# Patient Record
Sex: FEMALE
[De-identification: names, ages, dates, MRNs, and addresses within clinical notes are randomized per-mention and may not be internally consistent; named-entity substitution may affect disease eponyms.]

---

## 2022-09-24 ENCOUNTER — NURSE TRIAGE (OUTPATIENT)
Dept: OTHER | Facility: CLINIC | Age: 82
End: 2022-09-24

## 2022-09-24 NOTE — TELEPHONE ENCOUNTER
Subjective: Caller states \" I had the Moderna Variant shot on Thursday afternoon and I feel tired and have a headache \"     Current Symptoms:   +Friday morning - \"woke up with a headache and really tired\"   +feel sore allover   -denies redness or soreness at injection site  +headaches has subsided today   +able to walk normally   -denies shortness of breath or chest pain         Onset: 2 days ago; sudden    Associated Symptoms: NA    Pain Severity: 5/10; Soreness ; intermittent    Temperature: 99.1 orally    What has been tried: Advil     LMP: NA Pregnant: NA    Recommended disposition: Raheel Margie 9434 advice provided, patient verbalizes understanding; denies any other questions or concerns; instructed to call back for any new or worsening symptoms. Home Care with Care Advice     This triage is a result of a call to [address] [place] [address]. Please do not respond to the triage nurse through this encounter. Any subsequent communication should be directly with the patient.       Reason for Disposition   COVID-19 vaccine, systemic reactions (e.g., fatigue, fever, muscle aches), questions about    Protocols used: Coronavirus (COVID-19) Vaccine Questions and Reactions-ADULT-

## 2022-09-26 ENCOUNTER — NURSE TRIAGE (OUTPATIENT)
Dept: OTHER | Facility: CLINIC | Age: 82
End: 2022-09-26

## 2022-09-26 NOTE — TELEPHONE ENCOUNTER
Subjective: Caller states \"Covid\"     Current Symptoms:   Spoke to a nurse the other day about body and head aches, fatigue  Had just gotten a covid vaccine on Thursday, symptoms started the next day  Body aches have worsened, now having rib pain  Did a home Covid test and it is positive  Has a runny nose  Denies SOB, cough    Onset:   Friday; worsening    Associated Symptoms: NA    Pain Severity: 5/10; aching, worse with walking    Temperature: chills, 98.4 this morning     What has been tried: advil, coricidin     LMP: NA Pregnant: NA    Recommended disposition: Go to ED/UCC Now (Or to Office with PCP Approval)    Care advice provided, patient verbalizes understanding; denies any other questions or concerns; instructed to call back for any new or worsening symptoms. Patient/caller warm transferred to [facility] at [facility] for appointment scheduling    This triage is a result of a call to [address] [place] [address] of [employer]. Please do not respond to the triage nurse through this encounter. Any subsequent communication should be directly with the patient.     Reason for Disposition   Chest pain or pressure  (Exception: MILD central chest pain, present only when coughing)    Protocols used: Coronavirus (COVID-19) Diagnosed or Suspected-ADULT-[place]

## 2023-02-27 LAB — SARS-COV-2 RESULT: NOT DETECTED

## 2023-03-01 LAB
ACTIVATED PARTIAL THROMBOPLASTIN TIME IN PPP BY COAGULATION ASSAY: 33 SEC (ref 26–39)
INR IN PPP BY COAGULATION ASSAY: 1.1 (ref 0.9–1.1)
PROTHROMBIN TIME (PT) IN PPP BY COAGULATION ASSAY: 12.5 SEC (ref 9.8–13.4)

## 2023-06-13 LAB — THYROTROPIN (MIU/L) IN SER/PLAS BY DETECTION LIMIT <= 0.05 MIU/L: 3.81 MIU/L (ref 0.44–3.98)

## 2023-10-03 DIAGNOSIS — E03.9 HYPOTHYROIDISM (ACQUIRED): ICD-10-CM

## 2023-10-03 NOTE — TELEPHONE ENCOUNTER
Chart reviewed.  TSH in June was WNL.  Prescription renewed at current dose for 90 days with 2 additional refills.  Next TSH will be due June 2024

## 2023-10-04 DIAGNOSIS — R13.12 OROPHARYNGEAL DYSPHAGIA: ICD-10-CM

## 2023-10-04 RX ORDER — LEVOTHYROXINE SODIUM 25 UG/1
25 TABLET ORAL
Qty: 90 TABLET | Refills: 2 | Status: SHIPPED | OUTPATIENT
Start: 2023-10-04 | End: 2023-12-05 | Stop reason: DRUGHIGH

## 2023-10-04 NOTE — PROGRESS NOTES
From: Marco Monteil <[email or web address]>   Sent: Tuesday, October 3, 2023 4:17 PM  To: Bee Downey <[email or web address]>  Subject: Jennifer     Hi Bee. Jennifer and I just spoke with Dr. Adair about her prosthesis, speech, and swallowing. He's going to do a few more adjustments to improve lingual/palatal contact. She's had her prosthesis about 3-4 weeks and is continuing to work   Hi Bee.     Jennifer and I just spoke with Dr. Adair about her prosthesis, speech, and swallowing.  He's going to do a few more adjustments to improve lingual/palatal contact.  She's had her prosthesis about 3-4 weeks and is continuing to work hard in therapy.  We'd like to repeat the modified barium swallow.  We completed the last one on August 8.  Would you be ok with giving me that order?       Thanks!  We are aiming to complete the MBS Oct 12 or 13.     Marco Montiel M.A., CCC-SLP  Speech-Language Pathologist      They care not on EPIC.  Order entered and emailed to Marco.

## 2023-10-05 ENCOUNTER — TELEPHONE (OUTPATIENT)
Dept: OTOLARYNGOLOGY | Facility: HOSPITAL | Age: 83
End: 2023-10-05
Payer: MEDICARE

## 2023-10-05 DIAGNOSIS — R13.12 OROPHARYNGEAL DYSPHAGIA: ICD-10-CM

## 2023-10-05 NOTE — TELEPHONE ENCOUNTER
"Jennifer sent me an email today at 10:58 a.m. stating:    \"Johan Gamboa    Got a notice from [facility] that they could not fill my L-thyroxine (synthroid) prescription since it hasn't been renewed by Dr. Camacho.  Am I not required to take the medication anymore or was it just an oversight?  If ti was an oversight, could you please contact [facility] with a renewal since I will be completely out of the tablets in 10 days.  If I'm not to take it any longer, please advise me of that.    Thank you.    Jennifer\"    I reviewed the chart and emailed her back at 11:10 a.m.:    \"Jennifer--    Your prescription was renewed on 10/4/23 and the pharmacy confirmed electronic receiptof the refill at 8:56 a.m.  Please contact [facility] to follow up on this.    This is a medication that you will be on FOR LIFE.  The dose may change over time, which is why annual blood work is also needed in order to renew the prescription.    Bee Downey, MSN, RN, ACNS-BC, CORLN  Clinical Nurse Specialist  Certified Otorhinolaryngology Nurse  [phone]/fax [phone]\"  "

## 2023-10-19 PROBLEM — C01 CANCER OF BASE OF TONGUE (MULTI): Status: ACTIVE | Noted: 2023-10-19

## 2023-10-19 PROBLEM — D48.5 NEOPLASM OF UNCERTAIN BEHAVIOR OF SKIN: Status: ACTIVE | Noted: 2023-07-06

## 2023-10-19 PROBLEM — L92.9: Status: ACTIVE | Noted: 2023-10-19

## 2023-10-19 PROBLEM — L57.0 ACTINIC KERATOSIS: Status: ACTIVE | Noted: 2023-07-06

## 2023-10-19 PROBLEM — T66.XXXA ADVERSE EFFECT OF RADIATION: Status: ACTIVE | Noted: 2023-10-19

## 2023-10-19 PROBLEM — R13.10 DYSPHAGIA: Status: ACTIVE | Noted: 2023-10-19

## 2023-10-19 PROBLEM — C06.9: Status: ACTIVE | Noted: 2023-10-19

## 2023-10-19 PROBLEM — E03.9 ACQUIRED HYPOTHYROIDISM: Status: ACTIVE | Noted: 2023-10-19

## 2023-10-19 PROBLEM — L82.0 INFLAMED SEBORRHEIC KERATOSIS: Status: ACTIVE | Noted: 2023-07-06

## 2023-10-19 RX ORDER — CHLORHEXIDINE GLUCONATE ORAL RINSE 1.2 MG/ML
SOLUTION DENTAL 4 TIMES DAILY
COMMUNITY
Start: 2023-03-28

## 2023-10-19 RX ORDER — WARFARIN SODIUM 5 MG/1
TABLET ORAL
COMMUNITY
Start: 2018-01-30

## 2023-10-19 RX ORDER — OXYCODONE HYDROCHLORIDE 5 MG/1
5 TABLET ORAL EVERY 4 HOURS PRN
COMMUNITY
Start: 2023-02-20

## 2023-10-19 RX ORDER — ENOXAPARIN SODIUM 100 MG/ML
0.5 INJECTION SUBCUTANEOUS EVERY 12 HOURS
COMMUNITY
Start: 2023-02-16

## 2023-10-19 RX ORDER — LATANOPROST 50 UG/ML
SOLUTION/ DROPS OPHTHALMIC
COMMUNITY
Start: 2022-01-20

## 2023-10-19 RX ORDER — ATORVASTATIN CALCIUM 40 MG/1
TABLET, FILM COATED ORAL
COMMUNITY
Start: 2018-02-13

## 2023-10-19 RX ORDER — SODIUM FLUORIDE 5 MG/G
GEL, DENTIFRICE DENTAL
COMMUNITY
Start: 2023-10-02

## 2023-10-19 RX ORDER — ANTISEPTIC SURGICAL SCRUB 0.04 MG/ML
SOLUTION TOPICAL
COMMUNITY
Start: 2023-02-24

## 2023-10-19 RX ORDER — NYSTATIN 100000 [USP'U]/ML
2 SUSPENSION ORAL 4 TIMES DAILY
COMMUNITY
Start: 2023-05-22

## 2023-10-19 RX ORDER — ENOXAPARIN SODIUM 100 MG/ML
INJECTION SUBCUTANEOUS
COMMUNITY
Start: 2023-03-07

## 2023-10-19 RX ORDER — OXYCODONE HCL 5 MG/5 ML
SOLUTION, ORAL ORAL
COMMUNITY
Start: 2023-03-07

## 2023-10-19 RX ORDER — SODIUM CHLORIDE 0.9 G/100ML
IRRIGANT IRRIGATION
COMMUNITY
Start: 2023-03-07

## 2023-10-23 ENCOUNTER — OFFICE VISIT (OUTPATIENT)
Dept: DERMATOLOGY | Facility: CLINIC | Age: 83
End: 2023-10-23
Payer: MEDICARE

## 2023-10-23 DIAGNOSIS — Z12.83 SKIN CANCER SCREENING: ICD-10-CM

## 2023-10-23 DIAGNOSIS — L82.0 SEBORRHEIC KERATOSIS, INFLAMED: ICD-10-CM

## 2023-10-23 DIAGNOSIS — Z85.828 PERSONAL HISTORY OF SKIN CANCER: Primary | ICD-10-CM

## 2023-10-23 DIAGNOSIS — D22.9 NEVUS: ICD-10-CM

## 2023-10-23 PROCEDURE — 99213 OFFICE O/P EST LOW 20 MIN: CPT | Performed by: NURSE PRACTITIONER

## 2023-10-23 PROCEDURE — 1159F MED LIST DOCD IN RCRD: CPT | Performed by: NURSE PRACTITIONER

## 2023-10-23 PROCEDURE — 1126F AMNT PAIN NOTED NONE PRSNT: CPT | Performed by: NURSE PRACTITIONER

## 2023-10-23 ASSESSMENT — ITCH NUMERIC RATING SCALE: HOW SEVERE IS YOUR ITCHING?: 0

## 2023-10-23 NOTE — PROGRESS NOTES
Subjective     Jennifer Hua is a 83 y.o. female who presents for the following: Skin Check.     Review of Systems:  No other skin or systemic complaints other than what is documented elsewhere in the note.    The following portions of the chart were reviewed this encounter and updated as appropriate:       Skin Cancer History  Hx of SCC- left upper shoulder   Specialty Problems          Dermatology Problems    Actinic keratosis    Inflamed seborrheic keratosis    Neoplasm of uncertain behavior of skin    Abnormal granulation tissue of abdomen     Past Medical History:  Jennifer Hua  has a past medical history of Personal history of malignant neoplasm, unspecified, Personal history of other diseases of the circulatory system, Personal history of other diseases of the nervous system and sense organs, Personal history of other endocrine, nutritional and metabolic disease, and Personal history of other endocrine, nutritional and metabolic disease.    Past Surgical History:  Jennifer Hua  has a past surgical history that includes Rotator cuff repair (2018); Appendectomy (2018);  section, classic (2018); Hernia repair (2018); Tonsillectomy (2018); Foot surgery (2018); Back surgery (2018); and Hand surgery (2018).    Family History:  Patient family history includes Allergies in an other family member.    Social History:  Jennifer Hua  has no history on file for tobacco use, alcohol use, and drug use.    Allergies:  Carbamazepine, Shellfish containing products, and Simvastatin    Current Medications / CAM's:    Current Outpatient Medications:     atorvastatin (Lipitor) 40 mg tablet, Take by mouth., Disp: , Rfl:     Betasept Surgical Scrub 4 % external liquid, USE AS DIRECTED., Disp: , Rfl:     chlorhexidine (Peridex) 0.12 % solution, Use in the mouth or throat 4 times a day., Disp: , Rfl:     enoxaparin (Lovenox) 40 mg/0.4 mL syringe, , Disp: , Rfl:      enoxaparin (Lovenox) 60 mg/0.6 mL syringe, Inject 0.5 mL (50 mg) under the skin every 12 hours., Disp: , Rfl:     latanoprost (Xalatan) 0.005 % ophthalmic solution, Administer into affected eye(s)., Disp: , Rfl:     levothyroxine (Synthroid, Levoxyl) 25 mcg tablet, Take 1 tablet (25 mcg) by mouth once daily in the morning. Take before meals. TAKE ON AN EMPTY STOMACH, Disp: 90 tablet, Rfl: 2    nystatin (Mycostatin) 100,000 unit/mL suspension, Take 2 mL (200,000 Units) by mouth 4 times a day., Disp: , Rfl:     oxyCODONE (Roxicodone) 5 mg immediate release tablet, Take 1 tablet (5 mg) by mouth every 4 hours if needed., Disp: , Rfl:     oxyCODONE (Roxicodone) 5 mg/5 mL solution, , Disp: , Rfl:     PreviDent 1.1 % gel, USE ONE TIME PER DAY IN FLUORIDE CARRIERS  FOR 10 MINUTES, Disp: , Rfl:     sodium chloride 0.9 % irrigation solution, FOR USE WITH TRACHEOSTOMY CARE AND SUCTIONING THREE TIMES A DAY AND AS NEEDED, Disp: , Rfl:     warfarin (Coumadin) 5 mg tablet, Take by mouth., Disp: , Rfl:      Objective   Well appearing patient in no apparent distress; mood and affect are within normal limits.    A full examination was performed including scalp, head, eyes, ears, nose, lips, neck, chest, axillae, abdomen, back, buttocks, bilateral upper extremities, bilateral lower extremities, hands, feet, fingers, toes, fingernails, and toenails. All findings within normal limits unless otherwise noted below.    Assessment/Plan   1. Personal history of skin cancer  Left upper shoulder scar    No evidence of recurrence in scar and benign ROS.   Continue with total body skin exams every 6 months.  ABCDEs of melanoma and atypical moles were discussed with the patient.  Patient was instructed to perform monthly self skin examination.  We recommended that the patient have regular full skin exams given an increased risk of subsequent skin cancers.  The patient was instructed to use sun protective behaviors including use of broad spectrum  sunscreens and sun protective clothing to reduce risk of skin cancers.      2. Skin cancer screening    The patient presented for a routine skin examination today. There are no specific concerns regarding skin health and no new or changing moles, lesions, or rashes.     Assessment: Based on the comprehensive skin examination, there were no concerning or abnormal findings. The patient's skin appeared to be in good health, without any notable dermatologic conditions or lesions.    Plan: Given the absence of any significant skin findings, no specific interventions or treatments are warranted at this time. The patient was educated on the importance of regular skin self-examinations and advised to promptly report any changes or concerns. Routine follow-up for a skin examination was recommended.    -These lesions have benign, reassuring patterns on dermoscopy.  -There were no concerning features found on exam today.  -Recommend continued self-observation, and to contact the office if any changes in nevi are  noticed.    Discussed/information given on safe sun practices and use of sunscreen, sun protective clothing or sun avoidance. Recommend to use OTC medication of sunscreen SPF 30 or higher on a daily basis prior to sun exposure to reduce the risk of skin cancer.    Contact Office if: Any lesions change in size, shape or color; itch, bum or bleed.         3. Seborrheic keratosis, inflamed (2)  Generalized, Left Knee - Anterior  Left lower extremity inflamed keratotic papule    PLAN:  Treated with ILK today  Consider biopsy in the future if not resolved    4. Nevus    Plan: Counseling.  I counseled the patient regarding the following:  Instructions: Monthly self-skin checks to monitor for any changes in moles are recommended. Expectations: Benign Nevi are pigmented nests of cells within the skin.No treatment is necessary. Contact Office if: Any moles change in size, shape or color; itch, bum or bleed.

## 2023-11-01 ENCOUNTER — EVALUATION (OUTPATIENT)
Dept: SPEECH THERAPY | Facility: HOSPITAL | Age: 83
End: 2023-11-01
Payer: MEDICARE

## 2023-11-01 DIAGNOSIS — R13.12 OROPHARYNGEAL DYSPHAGIA: ICD-10-CM

## 2023-11-01 DIAGNOSIS — R13.10 DYSPHAGIA: Primary | ICD-10-CM

## 2023-11-01 DIAGNOSIS — R47.1 DYSARTHRIA: ICD-10-CM

## 2023-11-01 PROCEDURE — 92507 TX SP LANG VOICE COMM INDIV: CPT | Mod: GN

## 2023-11-01 PROCEDURE — 92526 ORAL FUNCTION THERAPY: CPT | Mod: GN

## 2023-11-01 PROCEDURE — 92610 EVALUATE SWALLOWING FUNCTION: CPT | Mod: GN

## 2023-11-01 ASSESSMENT — ENCOUNTER SYMPTOMS
DEPRESSION: 1
LOSS OF SENSATION IN FEET: 0
OCCASIONAL FEELINGS OF UNSTEADINESS: 0

## 2023-11-01 ASSESSMENT — PAIN - FUNCTIONAL ASSESSMENT: PAIN_FUNCTIONAL_ASSESSMENT: 0-10

## 2023-11-01 ASSESSMENT — PAIN SCALES - GENERAL: PAINLEVEL_OUTOF10: 0 - NO PAIN

## 2023-11-01 NOTE — LETTER
November 2, 2023     Patient: Yaritza Hua   YOB: 1940   Date of Visit: 11/1/2023       To Whom It May Concern:    It is my medical opinion that Yaritza Hua {Work release (duty restriction):36124}.    If you have any questions or concerns, please don't hesitate to call.         Sincerely,        Kelly Calderón, SLP    CC: No Recipients

## 2023-11-01 NOTE — LETTER
November 2, 2023     Patient: Yaritza Hua   YOB: 1940   Date of Visit: 11/1/2023       To Whom it May Concern:    Yaritza Hua was seen in my clinic on 11/1/2023. She {Return to school/sport:16676}.    If you have any questions or concerns, please don't hesitate to call.         Sincerely,          Kelly Calderón, SLP        CC: No Recipients

## 2023-11-01 NOTE — LETTER
November 2, 2023    Garry Adair DDS  [address]  [address] [place] [id number]    Patient: Jennifer Hua   YOB: 1940   Date of Visit: 11/1/2023       Dear Garry Adair Dds  [address]  [address] [place] [id number]    The attached plan of care is being sent to you because your patient’s medical reimbursement requires that you certify the plan of care. Your signature is required to allow uninterrupted insurance coverage.      You may indicate your approval by signing below and faxing this form back to us at No information on file..    Please call Dept: [phone] with any questions or concerns.    Thank you for this referral,        JANETTE Pino  [facility]  [address]  [facility] [address]    Payer: Payor: MEDICAL [facility] MEDICARE / Plan: [employer] [facility] MEDICARE / Product Type: *No Product type* /                                                                         Date:     Dear JANETTE Pino,     Re: Ms. Yaritza Hua, MRN:[id number]    I certify that I have reviewed the attached plan of care and it is medically necessary for Ms. Yaritza Hua (1940) who is under my care.          ______________________________________                    _________________  Provider name and credentials                                           Date and time                                                                                           Plan of Care 11/1/23   Effective from: 11/1/2023  Effective to: 12/2/2023    Plan ID: [id number]            Participants as of Finalize on 11/2/2023    Name Type Comments Contact Info    Garry Adair DDS Referring Physician Please sign and return Jennifer Hua evaluation plan of care.        Last Plan Note     Author: JANETTE Pino Status: Sign when Signing Visit Last edited: 11/1/2023  1:00 PM       Speech-Language Pathology    Outpatient  Speech-Language Pathology Clinical Swallow Evaluation  And Treatment    Patient Name: Jennifer Hua  MRN: [id number]  Today's Date: 11/1/2023      Time Calculation  Start Time: 1320  Stop Time: 1500  Time Calculation (min): 40 min swallow evaluation, 60 minutes treatment      Current Problem:   1. Dysphagia  Referral to Speech Therapy    Follow Up In Speech Therapy      2. Dysarthria  Follow Up In Speech Therapy      3. 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 Speech is moderately dysarthric and patient is not happy with current intelligibility specifically k/g phonemes. Complete PEG tube reliant, however, her goal is to eat some foods for pleasure. Imaging for MBS unavailable at this time as it was completed 10/12/23 at [facility].      Prognosis: Good  Treatment Provided: Yes   SLP added iso functional material to right lingual side of palatal prosthesis with goal of improving k,g, l,t,d noted as dysarthric during Holliday passage reading. Following addition of material to lower palatal, overall intelligibility improved and patient agreed to drop off prosthesis with Dr. Adair for direct transfer of material to prosthesis. Following conversion, patient will return for follow up on infallibility.    Treatment Tolerance: Patient tolerated treatment well  Strengths: Motivation, Cognition  Barriers: None      Plan:  Treatment/Interventions:  (palatal prosthesis functional adjustements)  SLP Plan: Skilled SLP  SLP Frequency: 1x per week  Duration: 90 days  Equipment Recommended: palatal prosthesis  Discussed POC:  Patient  Discussed Risks/Benefits: Patient  Patient/Caregiver Agreeable: Yes    GOALS:  Patient will tolerate modified oral foods for pleasure without overt s/s of aspiration.  Patient will participate in sEMG to assist with effortful swallow strategy as indicated.  Patient will participate with Neuromuscular Electrical Stimulation in conjunction with PO intake to improve pharyngeal strength as indicated.  Patient will participate with Deep Pharyngeal Neuromuscular electrical stimulation to improve oral strength and sensation as indicated.  Patient will articulate target phonemes with 75% intelligibility at sentence level.  Patient will be intelligible to listener on 75% of trials to allow for independence with communication of ADLs.  Patient will improve trismus to allow for impressions for palatal obturator, improved speech and swallow function.  SLP to assist Dr. Adair with function of oral prosthesis related to speech/swallowing issues.  Patient will complete home exercise program daily.     Subjective   Patient was recently discharged from Speech therapy at [facility]. Patient would like to further her intelligibility with palatal prosthesis and also consume oral foods for pleasure. She is eager to begin treatment and is highly motivated.      General Visit Information:  Patient Class: Outpatient  Living Environment: Home, Lives alone  Arrival: Independent  Ordering Physician: Garry Adair  Reason for Referral: assess function of palatal prosthesis for speech and swallow. 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 The ablative portion of the procedure was performed by my head and neck surgical colleague and my portion of the procedure was for the reconstruction. The indication for the myocutaneous ALT  free flap is to reconstruct the primary defect. Dr. Camacho completed his portion of the procedure (please see his note for full details). 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    Prior Level of Function: WFL  Certification Period Start Date: 11/02/23  Certification Period End Date: 12/02/23  Number of Authorized Treatments : MN  Total Number of Visits :  (1/10)  Date of Onset: 03/02/23  [employer] Diet: Prior to 3/2/23 surgery patient was on soft solids/thin liquids  Current Diet : NPO with PEG placed 3/2/23. On Isosource      Objective       Baseline Assessment:  History of Intubation: Yes (during surgery 3/2/23)      Pain:  Pain Assessment: 0-10  Pain Score: 0 - No pain       Oral/Motor Assessment:  Dentition: Adequate/Natural, Some Missing Teeth (missing 3 teeth left mandible)  Oral Motor: Impaired Function  Labial ROM: Reduced left  Labial Strength: Reduced  Labial Symmetry: Abnormal symmetry left  Lingual Agility: Reduced  Lingual ROM:  (absent due to surgery 3/2/23)  Lingual Strength: Reduced  Palatal Elevation: absent (during visualization into oral cavity)  Vocal Quality:  (hypernasal)  Intelligibility: Intelligibility reduced  Trismus: reduced (2 finger width opening).  Moderate Dysarthric speech at conversational level. Hypernasal resonance.    SLP Outcome Measures:  EAT 10  My swallowing problem has caused me to lose weight.: 4  My swallowing problem interferes with my ability to go out for meals.: 4  Swallowing liquids takes extra effort.: 4  Swallowing solids takes extra effort.: 4  Swallowing pills takes extra effort.: 4  Swallowing is painful: 0  The pleasure of eating is affected by my swallowing.: 4  When I swallow food sticks in my throat.: 4  I cough when I eat.: 4  Swallowing is stressful: 4  EAT-10 TOTAL SCORE:: 36             Outpatient Education:  Adult Outpatient Education  Individual(s) Educated: Patient  Verbal Education : Reviewed goals of care, assessment  results  Diagnosis and Precautions: aspiration  Risk and Benefits Discussed with Patient/Caregiver/Other: yes  Patient/Caregiver Demonstrated Understanding: yes  Plan of Care Discussed and Agreed Upon: yes  Patient Response to Education: Patient/Caregiver Verbalized Understanding of Information           Current Participants as of 11/2/2023    Name Type Comments Contact Info    Garry Adair DDS Referring Physician Please sign and return Jennifer Hua evaluation plan of care.     Signature pending

## 2023-11-02 NOTE — PROGRESS NOTES
Speech-Language Pathology    Outpatient Speech-Language Pathology Clinical Swallow Evaluation  And Treatment    Patient Name: Jennifer Hua  MRN: [id number]  Today's Date: 11/1/2023      Time Calculation  Start Time: 1320  Stop Time: 1500  Time Calculation (min): 40 min swallow evaluation, 60 minutes treatment      Current Problem:   1. Dysphagia  Referral to Speech Therapy    Follow Up In Speech Therapy      2. Dysarthria  Follow Up In Speech Therapy      3. 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 Speech is moderately dysarthric and patient is not happy with current intelligibility specifically k/g phonemes. Complete PEG tube reliant, however, her goal is to eat some foods for pleasure. Imaging for MBS unavailable at this time as it was completed 10/12/23 at [facility].      Prognosis: Good  Treatment Provided: Yes   SLP added iso functional material to right lingual side of palatal prosthesis with goal of improving k,g, l,t,d noted as dysarthric during [employer] passage reading. Following addition of material to lower palatal, overall intelligibility improved and patient agreed to drop off prosthesis with Dr. Adair for direct transfer of material to prosthesis. Following conversion, patient will return for follow up on infallibility.    Treatment Tolerance: Patient tolerated treatment well  Strengths: Motivation, Cognition  Barriers: None      Plan:  Treatment/Interventions:  (palatal prosthesis functional adjustements)  SLP Plan: Skilled SLP  SLP Frequency: 1x per week  Duration: 90 days  Equipment  Recommended: palatal prosthesis  Discussed POC: Patient  Discussed Risks/Benefits: Patient  Patient/Caregiver Agreeable: Yes    GOALS:  Patient will tolerate modified oral foods for pleasure without overt s/s of aspiration.  Patient will participate in sEMG to assist with effortful swallow strategy as indicated.  Patient will participate with Neuromuscular Electrical Stimulation in conjunction with PO intake to improve pharyngeal strength as indicated.  Patient will participate with Deep Pharyngeal Neuromuscular electrical stimulation to improve oral strength and sensation as indicated.  Patient will articulate target phonemes with 75% intelligibility at sentence level.  Patient will be intelligible to listener on 75% of trials to allow for independence with communication of ADLs.  Patient will improve trismus to allow for impressions for palatal obturator, improved speech and swallow function.  SLP to assist Dr. Adair with function of oral prosthesis related to speech/swallowing issues.  Patient will complete home exercise program daily.     Subjective   Patient was recently discharged from Speech therapy at [facility]. Patient would like to further her intelligibility with palatal prosthesis and also consume oral foods for pleasure. She is eager to begin treatment and is highly motivated.      General Visit Information:  Patient Class: Outpatient  Living Environment: Home, Lives alone  Arrival: Independent  Ordering Physician: Garry Adair  Reason for Referral: assess function of palatal prosthesis for speech and swallow. 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 The ablative portion of the procedure was performed by my head and neck surgical colleague and my portion of the procedure was for the  reconstruction. The indication for the myocutaneous ALT free flap is to reconstruct the primary defect. Dr. Camacho completed his portion of the procedure (please see his note for full details). 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    Prior Level of Function: WFL  Certification Period Start Date: 11/02/23  Certification Period End Date: 12/02/23  Number of Authorized Treatments : MN  Total Number of Visits :  (1/10)  Date of Onset: 03/02/23  BaseLine Diet: Prior to 3/2/23 surgery patient was on soft solids/thin liquids  Current Diet : NPO with PEG placed 3/2/23. On Isosource      Objective       Baseline Assessment:  History of Intubation: Yes (during surgery 3/2/23)      Pain:  Pain Assessment: 0-10  Pain Score: 0 - No pain       Oral/Motor Assessment:  Dentition: Adequate/Natural, Some Missing Teeth (missing 3 teeth left mandible)  Oral Motor: Impaired Function  Labial ROM: Reduced left  Labial Strength: Reduced  Labial Symmetry: Abnormal symmetry left  Lingual Agility: Reduced  Lingual ROM:  (absent due to surgery 3/2/23)  Lingual Strength: Reduced  Palatal Elevation: absent (during visualization into oral cavity)  Vocal Quality:  (hypernasal)  Intelligibility: Intelligibility reduced  Trismus: reduced (2 finger width opening).  Moderate Dysarthric speech at conversational level. Hypernasal resonance.    SLP Outcome Measures:  EAT 10  My swallowing problem has caused me to lose weight.: 4  My swallowing problem interferes with my ability to go out for meals.: 4  Swallowing liquids takes extra effort.: 4  Swallowing solids takes extra effort.: 4  Swallowing pills takes extra effort.: 4  Swallowing is painful: 0  The pleasure of eating is affected by my swallowing.: 4  When I swallow food sticks in my throat.: 4  I cough when I eat.: 4  Swallowing is stressful: 4  EAT-10 TOTAL SCORE:: 36             Outpatient Education:  Adult Outpatient Education  Individual(s) Educated: Patient  Verbal  Education : Reviewed goals of care, assessment results  Diagnosis and Precautions: aspiration  Risk and Benefits Discussed with Patient/Caregiver/Other: yes  Patient/Caregiver Demonstrated Understanding: yes  Plan of Care Discussed and Agreed Upon: yes  Patient Response to Education: Patient/Caregiver Verbalized Understanding of Information

## 2023-11-09 ENCOUNTER — TREATMENT (OUTPATIENT)
Dept: SPEECH THERAPY | Facility: HOSPITAL | Age: 83
End: 2023-11-09
Payer: MEDICARE

## 2023-11-09 DIAGNOSIS — R13.10 DYSPHAGIA: ICD-10-CM

## 2023-11-09 DIAGNOSIS — R47.1 DYSARTHRIA: ICD-10-CM

## 2023-11-09 DIAGNOSIS — R13.12 OROPHARYNGEAL DYSPHAGIA: Primary | ICD-10-CM

## 2023-11-09 PROCEDURE — 92526 ORAL FUNCTION THERAPY: CPT | Mod: GN

## 2023-11-09 ASSESSMENT — PAIN SCALES - GENERAL: PAINLEVEL_OUTOF10: 0 - NO PAIN

## 2023-11-09 ASSESSMENT — PAIN - FUNCTIONAL ASSESSMENT: PAIN_FUNCTIONAL_ASSESSMENT: 0-10

## 2023-11-09 NOTE — PROGRESS NOTES
"Speech-Language Pathology    Speech-Language Pathology    Outpatient Speech-Language Pathology Clinical Swallow Evaluation  And Treatment    Patient Name: Yaritza Hua \"Carmen"  MRN: [id number]  Today's Date: 11/1/2023      Time Calculation  Start Time: 1320  Stop Time: 1500  Time Calculation (min): 40 min swallow evaluation, 60 minutes treatment      Current Problem:   1. Oropharyngeal dysphagia        2. Dysphagia  Follow Up In Speech Therapy      3. 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 Speech is moderately dysarthric and patient is not happy with current intelligibility specifically k/g phonemes. Complete PEG tube reliant, however, her goal is to eat some foods for pleasure. Imaging for MBS unavailable at this time as it was completed 10/12/23 at [facility].      Prognosis: Good  Treatment Provided: Yes   SLP added iso functional material to right lingual side of palatal prosthesis with goal of improving k,g, l,t,d noted as dysarthric during [employer] passage reading. Following addition of material to lower palatal, overall intelligibility improved and patient agreed to drop off prosthesis with Dr. Adair for direct transfer of material to prosthesis. Following conversion, patient will return for follow up on infallibility.    Treatment Tolerance: Patient tolerated treatment well  Strengths: Motivation, Cognition  Barriers: None      Plan:       GOALS:  Patient will tolerate modified oral foods for pleasure without overt s/s of aspiration.  Patient will participate in sEMG to assist with effortful swallow strategy as indicated.  Patient will participate with Neuromuscular Electrical Stimulation in conjunction with PO intake to improve " pharyngeal strength as indicated.  Patient will participate with Deep Pharyngeal Neuromuscular electrical stimulation to improve oral strength and sensation as indicated.  Patient will articulate target phonemes with 75% intelligibility at sentence level.  Patient will be intelligible to listener on 75% of trials to allow for independence with communication of ADLs.  Patient will improve trismus to allow for impressions for palatal obturator, improved speech and swallow function.  SLP to assist Dr. Adair with function of oral prosthesis related to speech/swallowing issues.  Patient will complete home exercise program daily.     Subjective   Patient was recently discharged from Speech therapy at [facility]. Patient would like to further her intelligibility with palatal prosthesis and also consume oral foods for pleasure. She is eager to begin treatment and is highly motivated.      General Visit Information:  Patient Class: Outpatient  Living Environment: Home, Lives alone  Arrival: Independent  Ordering Physician: Garry Adair  Reason for Referral: assess function of palatal prosthesis for speech and swallow. 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 The ablative portion of the procedure was performed by my head and neck surgical colleague and my portion of the procedure was for the reconstruction. The indication for the myocutaneous ALT free flap is to reconstruct the primary defect. Dr. Camacho completed his portion of the procedure (please see his note for full details). 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    Prior Level of Function: WFL  Certification Period Start Date: 11/02/23  Certification Period End Date: 12/02/23  Number of Authorized Treatments : MN  Total Number of  Visits :  (1/10)  Date of Onset: 03/02/23  BaseLine Diet: Prior to 3/2/23 surgery patient was on soft solids/thin liquids  Current Diet : NPO with PEG placed 3/2/23. On Isosource      Objective       Baseline Assessment:         Pain:          Oral/Motor Assessment:     Trismus: reduced (2 finger width opening).  Moderate Dysarthric speech at conversational level. Hypernasal resonance.    SLP Outcome Measures:                Outpatient Education:

## 2023-11-10 NOTE — PROGRESS NOTES
"Speech-Language Pathology    Outpatient Speech-Language Pathology Treatment     Patient Name: Yaritza Hua \"Jennifer\"  MRN: [id number]  Today's Date: 11/9/2023     Time Calculation  Start Time: 1420  Stop Time: 1520  Time Calculation (min): 60 min      Current Problem:   1. Oropharyngeal dysphagia        2. Dysphagia  Follow Up In Speech Therapy      3. Dysarthria  Follow Up In Speech Therapy            SLP Assessment:  Prognosis: Good  Treatment Tolerance: Patient tolerated treatment well  Strengths: Motivation, Cognition  Barriers: None       Plan:  SLP Frequency: 1x per week  Duration: 90 days  Discussed POC: Patient  Discussed Risks/Benefits: Patient  Patient/Caregiver Agreeable: Yes      Subjective   Patient had palatal augmentation completed with SLP last session direct transferred by Dr. Adair and has been wearing prosthesis for several days. She feels intelligibility has improved. He friend ,complimented her speech saying she understood most everything Jennifer told her. However, she noted there are several sounds still lacking intelligibility.    Most Recent Visit:  SLP Received On: 11/09/23    General Visit Information:   Certification Period Start Date: 11/01/23  Certification Period End Date: 12/02/23  Number of Authorized Treatments : MN  Total Number of Visits :  (2/10)      Pain Assessment:   Pain Assessment: 0-10  Pain Score: 0 - No pain      Objective     GOALS:  Patient will tolerate modified oral foods for pleasure without overt s/s of aspiration: hold until palatal augmentation complete per patient request    Patient will participate in sEMG to assist with effortful swallow strategy as indicated:hold until palatal augmentation complete per patient request    Patient will participate with Neuromuscular Electrical Stimulation in conjunction with PO intake to improve pharyngeal strength as indicated:hold until palatal augmentation complete per patient request    Patient will participate with Deep " Pharyngeal Neuromuscular electrical stimulation to improve oral strength and sensation as indicated: hold until palatal augmentation complete per patient request    Patient will articulate target phonemes with 75% intelligibility at sentence level: improved l,d,t phonemes. SLP identified vowel distortion. This is most likely multifactorial in nature due to hyponasality and lingual reconstruction. Noted blends sk, st, ks distorted. Augmented palate with ISO functional material and patient noted improved intelligibility with sk, st. /s/ remains distorted. Will continue with augmentation adjustments next session.    Patient will be intelligible to listener on 75% of trials to allow for independence with communication of ADLs.    Patient will improve trismus to allow for impressions for palatal obturator, improved speech and swallow function.: n/a    SLP to assist Dr. Adair with function of oral prosthesis related to speech/swallowing issues: will place call with update to prosthesis following next visit.    Patient will complete home exercise program daily.n/a        Outpatient Education:  Adult Outpatient Education  Individual(s) Educated: Patient  Verbal Education : reviewed patient priority for goals: she would like to focus on speech and then move to swallow function.  Risk and Benefits Discussed with Patient/Caregiver/Other: yes  Patient/Caregiver Demonstrated Understanding: yes  Plan of Care Discussed and Agreed Upon: yes  Patient Response to Education: Patient/Caregiver Verbalized Understanding of Information

## 2023-11-16 ENCOUNTER — TREATMENT (OUTPATIENT)
Dept: SPEECH THERAPY | Facility: HOSPITAL | Age: 83
End: 2023-11-16
Payer: MEDICARE

## 2023-11-16 DIAGNOSIS — R47.1 DYSARTHRIA: ICD-10-CM

## 2023-11-16 DIAGNOSIS — R13.10 DYSPHAGIA: ICD-10-CM

## 2023-11-16 PROCEDURE — 92507 TX SP LANG VOICE COMM INDIV: CPT | Mod: GN

## 2023-11-16 ASSESSMENT — PAIN SCALES - GENERAL: PAINLEVEL_OUTOF10: 0 - NO PAIN

## 2023-11-16 ASSESSMENT — PAIN - FUNCTIONAL ASSESSMENT: PAIN_FUNCTIONAL_ASSESSMENT: 0-10

## 2023-11-16 NOTE — PROGRESS NOTES
"Speech-Language Pathology    Outpatient Speech-Language Pathology Treatment     Patient Name: Yaritza Hua \"Carmen"  MRN: [id number]  Today's Date: 11/9/2023     Time Calculation  Start Time: 1310  Stop Time: 1410  Time Calculation (min): 60 min      Current Problem:   1. Dysphagia  Follow Up In Speech Therapy      2. Dysarthria  Follow Up In Speech Therapy            SLP Assessment:  Prognosis: Good  Treatment Tolerance: Patient tolerated treatment well  Strengths: Cognition, Motivation  Barriers: None       Plan:  SLP Frequency: 1x per week (wait call for next visit due to prosthedontist work needed)  Duration: 90 days  Discussed POC: Patient  Discussed Risks/Benefits: Patient  Patient/Caregiver Agreeable: Yes      Subjective   Has appointment with Dr. Adair immediately follow this ST appointment. SLP spoke with Dr. Adair following appointment to update on plan for augmentation and lift. Patient and DDS agree to plan.    Most Recent Visit:  SLP Received On: 11/16/23    General Visit Information:   Certification Period Start Date: 11/01/23  Certification Period End Date: 12/02/23  Number of Authorized Treatments : MN      Pain Assessment:   Pain Assessment: 0-10  Pain Score: 0 - No pain      Objective     GOALS:  Patient will tolerate modified oral foods for pleasure without overt s/s of aspiration: hold until palatal augmentation complete per patient request    Patient will participate in sEMG to assist with effortful swallow strategy as indicated:hold until palatal augmentation complete per patient request    Patient will participate with Neuromuscular Electrical Stimulation in conjunction with PO intake to improve pharyngeal strength as indicated:hold until palatal augmentation complete per patient request    Patient will participate with Deep Pharyngeal Neuromuscular electrical stimulation to improve oral strength and sensation as indicated: hold until palatal augmentation complete per patient " request    Patient will articulate target phonemes with 75% intelligibility at sentence level:   Distortion of /s/ is largely impacted by hypernasal speech. Added ISO functional material to patient left and noted vast improvements with charito pacheco. Spoke with DDS re: adding to palate and need to lift palate to improve resonance. He will see patient for above noted then she return to . Will await call for scheduling.    Patient will be intelligible to listener on 75% of trials to allow for independence with communication of ADLs.    Patient will improve trismus to allow for impressions for palatal obturator, improved speech and swallow function.: n/a    SLP to assist Dr. Ariza with function of oral prosthesis related to speech/swallowing issues: spoke with DR. Ariza, see above.    Patient will complete home exercise program daily.n/a        Outpatient Education:  Adult Outpatient Education  Individual(s) Educated: Patient  Verbal Education : reviewed plan of seeing dr ariza for palatal augmentation and lift/bulb then return to . SLP to await her call to schedule next visit.  Diagnosis and Precautions: aspiration  Risk and Benefits Discussed with Patient/Caregiver/Other: yes  Patient/Caregiver Demonstrated Understanding: yes  Plan of Care Discussed and Agreed Upon: yes  Patient Response to Education: Patient/Caregiver Verbalized Understanding of Information

## 2023-11-24 ENCOUNTER — TELEPHONE (OUTPATIENT)
Dept: OTOLARYNGOLOGY | Facility: HOSPITAL | Age: 83
End: 2023-11-24
Payer: MEDICARE

## 2023-11-24 NOTE — TELEPHONE ENCOUNTER
From: Jennifer Hua <[email or web address]   Sent: Monday, November 20, 2023 4:44 PM  To: Bee Downey <[email or web address]  Subject: PEG tube replacement    Johan Gamboa,    I have a PEG tube placement scheduled for December 1st.  You've already told me that it is at the main campus and I don't need a .  However, I do have a few other questions about it which are as follows:  :  1.  Do I need to go off my Coumadin before the procedure?  2.  How long does the procedure take?  3.  Is the present site used or is a new one made?  The reason I'm asking this is because I have some tenderness around the present site and it still drains to the point that it require a dressing to keep the drainage from getting on my clothing.    I guess I really am wanting to know the full procedure for the replacement.  If you could describe it for me and answer my above questions, I would be most appreciative.  Oh, one other question.  All the information I have is to go to the [address] [place] [address] but where exactly do I go to report in?    Thanks so much.    May you have a Happy Thanksgiving and get in some rest and relaxation.    Jennifer      My response back to her this morning after confirming with Dr. Camacho if he wants her off the coumadin and if so, for how long.    Good morning Jennifer--    I hope you had a nice Thanksgiving.    Dr. Camacho asked if it would be possible for you to hold your Coumadin for 3 days before the procedure.  You will be able to resume it the same day after the tube is changed.    It takes maybe 5 minutes as he will use the same opening.    This tube is changed in the office, so report to the first floor [facility], Desk A.  You check in just as if you were checking in for an apt in [place].  He will recline the ENT exam chair.  The old tube is pulled out, and a new one is inserted into the same opening.  He will flush the tube to make sure it's in place and patent.  That's it.    I hope this  information is helpful and makes it a bit clearer as to what to expect.    Have a great weekend.    Bee Downey, MSN, RN, ACNS-BC, CORLN  Clinical Nurse Specialist  Certified Otorhinolaryngology Nurse

## 2023-11-30 NOTE — PROGRESS NOTES
Provider Impressions     Large base of tongue lesion which was treated with a combination of chemotherapy and radiation. There is no evidence of any tumor recurrence.      Status post recent surgery for a second primary on the contralateral oral tongue and oropharynx. She just completed another course of radiation.  Her recent PET scan is negative.  I do not see any evidence of tumor recurrence.     Dysphagia.  She is mainly PEG dependent.  I shortened the tube today because it was plugged and also changed her clip.    Hypothyroidism for which she is on Synthroid.  A TSH will be obtained today.     I will see her in 3 months.      Chief Complaint     Follow-up status post surgery for the management of a left oral cavity and oropharyngeal cancer.      History of Present Illness    This lady was seen for the management of a squamous cell carcinoma of the base of tongue. She was diagnosed with a T4 N0 lesion of her base of tongue in February 2018. The tumor was P 16 positive. She was treated with a combination of chemotherapy and radiation and completed her treatments on April 25, 2018. She had a PET scan in March 2019 which was negative. She has had a TSH level in February 2023 which was slightly elevated. She is on Synthroid. She had some chest scanning in February 2023 which was negative. She was seen in January 2023 because of a concern about a left tongue lesion. On biopsy this turned out to be consistent with squamous cell carcinoma. She had surgery on March 2, 2023. This was followed by radiation therapy. She had a recent swallow evaluation which she failed. She remains PEG dependent.  She has had issues with blockage of her feeding tube recently.  She is here to have it changed.    Physical Exam    Examination of the oral cavity shows good healing of the recent surgical site. The oropharyngeal inlet is now of good size. Palpation of the parotid, neck, thyroid feel fails to show any worrisome masses or  adenopathies.      A flexible laryngoscopy was carried out. Under topical Xylocaine and Darío-Synephrine the scope was introduced through the nostril. The nasopharynx, base of tongue, hypopharynx, and larynx are visualized. The vocal cords are normally mobile. There is significant pooling of secretions in the pharynx. The airway is adequate.    The PEG tube was examined.  The tube is in fairly good condition but the distal aspect is really clogged up.  I ended up shortening the tube and providing her with a new clip which will prevent backup of food in the tube.  I also flushed it and there was very functional.

## 2023-12-01 ENCOUNTER — LAB (OUTPATIENT)
Dept: LAB | Facility: HOSPITAL | Age: 83
End: 2023-12-01
Payer: MEDICARE

## 2023-12-01 ENCOUNTER — OFFICE VISIT (OUTPATIENT)
Dept: OTOLARYNGOLOGY | Facility: HOSPITAL | Age: 83
End: 2023-12-01
Payer: MEDICARE

## 2023-12-01 VITALS — BODY MASS INDEX: 16.73 KG/M2 | WEIGHT: 98 LBS | TEMPERATURE: 98.2 F | HEIGHT: 64 IN

## 2023-12-01 DIAGNOSIS — C01 CANCER OF BASE OF TONGUE (MULTI): Primary | ICD-10-CM

## 2023-12-01 DIAGNOSIS — R13.12 OROPHARYNGEAL DYSPHAGIA: ICD-10-CM

## 2023-12-01 DIAGNOSIS — E03.9 ACQUIRED HYPOTHYROIDISM: ICD-10-CM

## 2023-12-01 LAB
HOLD SPECIMEN: NORMAL
HOLD SPECIMEN: NORMAL
TSH SERPL-ACNC: 4.8 MIU/L (ref 0.44–3.98)

## 2023-12-01 PROCEDURE — 31575 DIAGNOSTIC LARYNGOSCOPY: CPT | Performed by: OTOLARYNGOLOGY

## 2023-12-01 PROCEDURE — 1036F TOBACCO NON-USER: CPT | Performed by: OTOLARYNGOLOGY

## 2023-12-01 PROCEDURE — 84443 ASSAY THYROID STIM HORMONE: CPT

## 2023-12-01 PROCEDURE — 99213 OFFICE O/P EST LOW 20 MIN: CPT | Performed by: OTOLARYNGOLOGY

## 2023-12-01 PROCEDURE — 36415 COLL VENOUS BLD VENIPUNCTURE: CPT

## 2023-12-01 PROCEDURE — 1126F AMNT PAIN NOTED NONE PRSNT: CPT | Performed by: OTOLARYNGOLOGY

## 2023-12-01 PROCEDURE — 1160F RVW MEDS BY RX/DR IN RCRD: CPT | Performed by: OTOLARYNGOLOGY

## 2023-12-01 PROCEDURE — 1159F MED LIST DOCD IN RCRD: CPT | Performed by: OTOLARYNGOLOGY

## 2023-12-01 PROCEDURE — 99213 OFFICE O/P EST LOW 20 MIN: CPT | Mod: 25 | Performed by: OTOLARYNGOLOGY

## 2023-12-01 ASSESSMENT — PATIENT HEALTH QUESTIONNAIRE - PHQ9
2. FEELING DOWN, DEPRESSED OR HOPELESS: NOT AT ALL
1. LITTLE INTEREST OR PLEASURE IN DOING THINGS: NOT AT ALL
SUM OF ALL RESPONSES TO PHQ9 QUESTIONS 1 & 2: 0

## 2023-12-05 ENCOUNTER — TELEPHONE (OUTPATIENT)
Dept: OTOLARYNGOLOGY | Facility: HOSPITAL | Age: 83
End: 2023-12-05
Payer: MEDICARE

## 2023-12-05 DIAGNOSIS — E03.9 ACQUIRED HYPOTHYROIDISM: ICD-10-CM

## 2023-12-05 RX ORDER — LEVOTHYROXINE SODIUM 50 UG/1
50 TABLET ORAL
Qty: 90 TABLET | Refills: 0 | Status: SHIPPED | OUTPATIENT
Start: 2023-12-05 | End: 2024-02-02 | Stop reason: SDUPTHER

## 2023-12-05 NOTE — TELEPHONE ENCOUNTER
Jennifer called me back and I reviewed her test results.    She confirmed that she has been taking the medication correctly.    We increased her medication to 50 mcg daily.  She will use up her 25 mcg tablets by taking 2 per day starting tomorrow.  The script being sent to [facility] will be the 50 mcg tablets and she understands that when she picks up that prescription she will go back to 1 tablet per day.    TSH order will be mailed to her to repeat her TSH the week of January 22nd.    Jennifer verbalized an understanding of the treatment plan and appreciated the call and follow up on her test results.

## 2023-12-05 NOTE — TELEPHONE ENCOUNTER
----- Message from Gerardo Camacho MD sent at 12/1/2023  5:36 PM EST -----  We need to increase syntroid...unless she is not taking the med properly  ----- Message -----  From: Lab, Background User  Sent: 12/1/2023   3:55 PM EST  To: Gerardo Camacho MD

## 2023-12-05 NOTE — TELEPHONE ENCOUNTER
Chart reviewed.  Jennifer is currently on levothyroxine 25 mcg daily.  Plan will be to increase to 50 mcg daily and repeat labs in 6 weeks.    I called and left her a message asking that she please call me back to discuss her recent blood test results.  I will await her call back.

## 2023-12-27 ENCOUNTER — TREATMENT (OUTPATIENT)
Dept: SPEECH THERAPY | Facility: HOSPITAL | Age: 83
End: 2023-12-27
Payer: MEDICARE

## 2023-12-27 DIAGNOSIS — R47.1 DYSARTHRIA: ICD-10-CM

## 2023-12-27 DIAGNOSIS — R13.10 DYSPHAGIA: ICD-10-CM

## 2023-12-27 PROCEDURE — 92507 TX SP LANG VOICE COMM INDIV: CPT | Mod: GN

## 2023-12-27 ASSESSMENT — PAIN SCALES - GENERAL
PAINLEVEL_OUTOF10: 0 - NO PAIN
PAINLEVEL_OUTOF10: 0 - NO PAIN

## 2023-12-27 ASSESSMENT — PAIN - FUNCTIONAL ASSESSMENT
PAIN_FUNCTIONAL_ASSESSMENT: 0-10
PAIN_FUNCTIONAL_ASSESSMENT: 0-10

## 2023-12-27 NOTE — Clinical Note
December 27, 2023    Garry Adair DDS  [address]  [address] [place] [id number]    Patient: Jennifer Hua   YOB: 1940   Date of Visit: 12/27/2023       Dear Garry Adair Dds  [address] [facility]   [address] [place] [id number]    The attached plan of care is being sent to you because your patient’s medical reimbursement requires that you certify the plan of care. Your signature is required to allow uninterrupted insurance coverage.      You may indicate your approval by signing below and faxing this form back to us at No information on file..    Please call Dept: [phone] with any questions or concerns.    Thank you for this referral,        JANETTE Pino  [facility]  [address] [facility] [address]  [facility] [id number] [phone]    Payer: Payor: [facility] MEDICARE / Plan: [employer] [facility] MEDICARE / Product Type: *No Product type* /                                                                         Date:     Dear JANETTE Pino,     Re: Ms. Yaritza Hua, MRN:[id number]    I certify that I have reviewed the attached plan of care and it is medically necessary for Ms. Yaritza Hua (1940) who is under my care.          ______________________________________                    _________________  Provider name and credentials                                           Date and time                                                                                           Plan of Care 12/27/23   Effective from: 12/27/2023  Effective to: 1/26/2024    Plan ID: [id number]            Participants as of Finalize on 12/27/2023    Name Type Comments Contact Info    Garry Adair DDS Referring Physician Please review and sign POC.        Last Plan Note     Author: JANETTE Pino Status: Signed Last edited: 12/27/2023  2:00 PM       Speech-Language Pathology    Outpatient Speech-Language Pathology  "Treatment     Patient Name: Yaritza Hua \"Jennifer\"  MRN: [id number]  Today's Date: 12/27/2023     Time Calculation  Start Time: 1315  Stop Time: 1410  Time Calculation (min): 55 min      Current Problem:   1. Dysphagia  Follow Up In Speech Therapy      2. Dysarthria  Follow Up In Speech Therapy            SLP Assessment:  Prognosis: Good  Treatment Tolerance: Patient tolerated treatment well  Strengths: Cognition, Motivation  Barriers: None       Plan:  SLP Frequency: 1x per week  Duration: 30 days  Discussed POC: Patient  Discussed Risks/Benefits: Patient  Patient/Caregiver Agreeable: Yes      Subjective   Denture augmented several times since last session. Patient notes improvement with some consonants like s, however, would like to see improvement with k,g.    General Visit Information:   Certification Period Start Date: 12/27/23  Certification Period End Date: 01/26/24  Number of Authorized Treatments : MN      Pain Assessment:   Pain Assessment: 0-10  Pain Score: 0 - No pain      Objective     GOALS:    Dysphagia:  Patient will tolerate modified oral foods for pleasure without overt s/s of aspiration: hold until palatal augmentation complete per patient request    Patient will participate in sEMG to assist with effortful swallow strategy as indicated:hold until palatal augmentation complete per patient request    Patient will participate with Neuromuscular Electrical Stimulation in conjunction with PO intake to improve pharyngeal strength as indicated:hold until palatal augmentation complete per patient request    Patient will participate with Deep Pharyngeal Neuromuscular electrical stimulation to improve oral strength and sensation as indicated: hold until palatal augmentation complete per patient request    Intelligibility:  Patient will articulate target phonemes with 75% intelligibility at sentence level: noted consistent distortion with k in all positions. Added isofunctional material to transition " between denture and bulb, improved initial and final /k/. Will continue with functional changes next visit.    Patient will be intelligible to listener on 75% of trials to allow for independence with communication of ADLs.  Improved resonance with addition of bulb. Will continue with phoneme specific improvements and hold addition to bulb at this time. Patient is feeling comfortable with bulb.    Patient will improve trismus to allow for impressions for palatal obturator, improved speech and swallow function.: n/a    SLP to assist Dr. Adair with function of oral prosthesis related to speech/swallowing issues: ongoing    Patient will complete home exercise program daily: patient to track distorted sounds as noted above.        Outpatient Education:  Adult Outpatient Education  Individual(s) Educated: Patient  Verbal Education : reviewed plan to reassess next session and solidify palatal augmentation changes.  Diagnosis and Precautions: aspiration  Risk and Benefits Discussed with Patient/Caregiver/Other: yes  Patient/Caregiver Demonstrated Understanding: yes  Plan of Care Discussed and Agreed Upon: yes  Patient Response to Education: Patient/Caregiver Verbalized Understanding of Information                  Current Participants as of 12/27/2023    Name Type Comments Contact Info    Garry Adair DDS Referring Physician Please review and sign POC.     Signature pending

## 2023-12-27 NOTE — PROGRESS NOTES
"Speech-Language Pathology    Outpatient Speech-Language Pathology Treatment     Patient Name: Yaritza Hua \"Carmen"  MRN: [id number]  Today's Date: 12/27/2023     Time Calculation  Start Time: 1315  Stop Time: 1410  Time Calculation (min): 55 min      Current Problem:   1. Dysphagia  Follow Up In Speech Therapy      2. Dysarthria  Follow Up In Speech Therapy            SLP Assessment:  Prognosis: Good  Treatment Tolerance: Patient tolerated treatment well  Strengths: Cognition, Motivation  Barriers: None       Plan:  SLP Frequency: 1x per week  Duration: 30 days  Discussed POC: Patient  Discussed Risks/Benefits: Patient  Patient/Caregiver Agreeable: Yes      Subjective   Denture augmented several times since last session. Patient notes improvement with some consonants like s, however, would like to see improvement with k,g.    General Visit Information:   Certification Period Start Date: 12/27/23  Certification Period End Date: 01/26/24  Number of Authorized Treatments : MN      Pain Assessment:   Pain Assessment: 0-10  Pain Score: 0 - No pain      Objective     GOALS:    Dysphagia:  Patient will tolerate modified oral foods for pleasure without overt s/s of aspiration: hold until palatal augmentation complete per patient request    Patient will participate in sEMG to assist with effortful swallow strategy as indicated:hold until palatal augmentation complete per patient request    Patient will participate with Neuromuscular Electrical Stimulation in conjunction with PO intake to improve pharyngeal strength as indicated:hold until palatal augmentation complete per patient request    Patient will participate with Deep Pharyngeal Neuromuscular electrical stimulation to improve oral strength and sensation as indicated: hold until palatal augmentation complete per patient request    Intelligibility:  Patient will articulate target phonemes with 75% intelligibility at sentence level: noted consistent distortion with k " in all positions. Added isofunctional material to transition between denture and bulb, improved initial and final /k/. Will continue with functional changes next visit.    Patient will be intelligible to listener on 75% of trials to allow for independence with communication of ADLs.  Improved resonance with addition of bulb. Will continue with phoneme specific improvements and hold addition to bulb at this time. Patient is feeling comfortable with bulb.    Patient will improve trismus to allow for impressions for palatal obturator, improved speech and swallow function.: n/a    SLP to assist Dr. Adair with function of oral prosthesis related to speech/swallowing issues: ongoing    Patient will complete home exercise program daily: patient to track distorted sounds as noted above.        Outpatient Education:  Adult Outpatient Education  Individual(s) Educated: Patient  Verbal Education : reviewed plan to reassess next session and solidify palatal augmentation changes.  Diagnosis and Precautions: aspiration  Risk and Benefits Discussed with Patient/Caregiver/Other: yes  Patient/Caregiver Demonstrated Understanding: yes  Plan of Care Discussed and Agreed Upon: yes  Patient Response to Education: Patient/Caregiver Verbalized Understanding of Information

## 2023-12-28 ENCOUNTER — APPOINTMENT (OUTPATIENT)
Dept: SPEECH THERAPY | Facility: HOSPITAL | Age: 83
End: 2023-12-28
Payer: MEDICARE

## 2024-01-04 ENCOUNTER — TREATMENT (OUTPATIENT)
Dept: SPEECH THERAPY | Facility: HOSPITAL | Age: 84
End: 2024-01-04
Payer: MEDICARE

## 2024-01-04 DIAGNOSIS — R47.1 DYSARTHRIA: Primary | ICD-10-CM

## 2024-01-04 DIAGNOSIS — R13.10 DYSPHAGIA: ICD-10-CM

## 2024-01-04 PROCEDURE — 92526 ORAL FUNCTION THERAPY: CPT | Mod: GN

## 2024-01-04 ASSESSMENT — PAIN - FUNCTIONAL ASSESSMENT: PAIN_FUNCTIONAL_ASSESSMENT: 0-10

## 2024-01-04 ASSESSMENT — PAIN SCALES - GENERAL: PAINLEVEL_OUTOF10: 0 - NO PAIN

## 2024-01-04 NOTE — PROGRESS NOTES
"Speech-Language Pathology    Outpatient Speech-Language Pathology Treatment     Patient Name: Yaritza Hua \"Jennifer\"  MRN: [id number]  Today's Date: 1/4/24     Time Calculation  Start Time: 1505  Stop Time: 1600  Time Calculation (min): 55 min      Current Problem:   1. Dysarthria  Follow Up In Speech Therapy    Follow Up In Speech Therapy    Follow Up In Speech Therapy      2. Dysphagia  Follow Up In Speech Therapy    Follow Up In Speech Therapy    Follow Up In Speech Therapy            SLP Assessment:  Added isofunctional material to palatal bulb and palate. Noted improvement with resonance and clarity of all positions of /k/. Patient will have changes made to denture with Dr. Adair, will reassess sound next session. Provided masseter massage and stretching exercises for home practice. Patient c/o \"jaw stiffness\" with increased verbal communication.    Prognosis: Good  Treatment Tolerance: Patient tolerated treatment well  Strengths: Cognition, Motivation       Plan:  SLP Frequency: 1x per week  Duration: 30 days  Discussed POC: Patient  Discussed Risks/Benefits: Patient  Patient/Caregiver Agreeable: Yes      Subjective   Completed home exercises and created target list of words she finds are less intelligible.    General Visit Information:   Certification Period Start Date: 12/27/23  Certification Period End Date: 01/26/24  Number of Authorized Treatments : MN  Total Number of Visits :  (2/10)      Pain Assessment:   Pain Assessment: 0-10  Pain Score: 0 - No pain      Objective     GOALS:    Dysphagia:  Patient will tolerate modified oral foods for pleasure without overt s/s of aspiration: hold until palatal augmentation complete per patient request    Patient will participate in sEMG to assist with effortful swallow strategy as indicated:hold until palatal augmentation complete per patient request    Patient will participate with Neuromuscular Electrical Stimulation in conjunction with PO intake to improve " pharyngeal strength as indicated:hold until palatal augmentation complete per patient request    Patient will participate with Deep Pharyngeal Neuromuscular electrical stimulation to improve oral strength and sensation as indicated: hold until palatal augmentation complete per patient request    Intelligibility:  Patient will articulate target phonemes with 75% intelligibility at sentence level: much improved intelligibility following addition of isofunctional material. This will be direct transferred to denture with Dr. Adair.    Patient will be intelligible to listener on 75% of trials to allow for independence with communication of ADLs.  Improved resonance with addition of material to palatal side of the bulb. This will be transferred to denture with dr Adair.    Patient will improve trismus to allow for impressions for palatal obturator, improved speech and swallow function.: instructed masseter massage and stretching (deferred intraoral massage at this time.)    SLP to assist Dr. Adair with function of oral prosthesis related to speech/swallowing issues: ongoing    Patient will complete home exercise program daily: added masseter exercises.    Outpatient Education:  Adult Outpatient Education  Individual(s) Educated: Patient  Verbal Education : revewed/added to home exercise plan  Risk and Benefits Discussed with Patient/Caregiver/Other: yes  Patient/Caregiver Demonstrated Understanding: yes  Plan of Care Discussed and Agreed Upon: yes  Patient Response to Education: Patient/Caregiver Verbalized Understanding of Information

## 2024-01-10 ENCOUNTER — TREATMENT (OUTPATIENT)
Dept: SPEECH THERAPY | Facility: HOSPITAL | Age: 84
End: 2024-01-10
Payer: MEDICARE

## 2024-01-10 DIAGNOSIS — R47.1 DYSARTHRIA: ICD-10-CM

## 2024-01-10 DIAGNOSIS — R13.10 DYSPHAGIA: ICD-10-CM

## 2024-01-10 DIAGNOSIS — R13.12 OROPHARYNGEAL DYSPHAGIA: Primary | ICD-10-CM

## 2024-01-10 PROCEDURE — 92507 TX SP LANG VOICE COMM INDIV: CPT | Mod: GN

## 2024-01-10 PROCEDURE — 92526 ORAL FUNCTION THERAPY: CPT | Mod: GN

## 2024-01-10 ASSESSMENT — PAIN SCALES - GENERAL: PAINLEVEL_OUTOF10: 0 - NO PAIN

## 2024-01-10 ASSESSMENT — PAIN - FUNCTIONAL ASSESSMENT: PAIN_FUNCTIONAL_ASSESSMENT: 0-10

## 2024-01-10 NOTE — PROGRESS NOTES
"Speech-Language Pathology    Outpatient Speech-Language Pathology Treatment     Patient Name: Yaritza Hua \"Jennifer\"  MRN: [id number]  Today's Date: 1/10/24     Time Calculation  Start Time: 1520  Stop Time: 1620  Time Calculation (min): 60 min      Current Problem:   1. Dysphagia  Follow Up In Speech Therapy      2. Dysarthria  Follow Up In Speech Therapy            SLP Assessment:    Palatal augmentation completed by Dr. Adair based on changes identified last session. The bulb was not adjusted due to inability to properly complete 2 adjustments concurrently. Intelligibility at 75% today during reading of passage unknown to SLP. Patient agreed with 75% intelligibility during playback of recording on her phone. She asked for water to rinse and spit to alleviate her dry mouth, however, she spontaneously swallowed the water and was very happy and surprised at how well \"it went down\". She became tearful at the thought of swallowing again. Adjusted plan of care: will assess swallow function with prosthesis in place next session. Based on evaluation results, will begin effortful swallow strategy with use of sEMG. Goal is to improve speech intelligibility and swallow function. Will repeat MBS following several sessions of oral intake. SLP reviewed images in PACS from MBS completed in 8/23 and 10/23 at [facility]. Patient noted to have laryngeal penetration with thin liquids, this was prior to palatal augmentation. Patient agrees with plan and eager to begin swallowing therapy.    Prognosis: Good  Treatment Tolerance: Patient tolerated treatment well  Strengths: Cognition, Motivation       Plan:  SLP Frequency: 1x per week  Duration: 30 days  Discussed POC: Patient  Discussed Risks/Benefits: Patient  Patient/Caregiver Agreeable: Yes      Subjective   Completed home exercises and created target list of words she finds are less intelligible.    General Visit Information:   Certification Period Start Date: " 12/27/23  Certification Period End Date: 01/26/24  Number of Authorized Treatments : MN  Total Number of Visits :  (3/10)      Pain Assessment:   Pain Assessment: 0-10  Pain Score: 0 - No pain      Objective     GOALS:    Dysphagia:  Patient will tolerate modified oral foods for pleasure without overt s/s of aspiration: hold until palatal augmentation complete per patient request    Patient will participate in sEMG to assist with effortful swallow strategy as indicated:hold until palatal augmentation complete per patient request    Patient will participate with Neuromuscular Electrical Stimulation in conjunction with PO intake to improve pharyngeal strength as indicated:hold until palatal augmentation complete per patient request    Patient will participate with Deep Pharyngeal Neuromuscular electrical stimulation to improve oral strength and sensation as indicated: hold until palatal augmentation complete per patient request    Intelligibility:  Patient will articulate target phonemes with 75% intelligibility at sentence level: much improved intelligibility following addition of isofunctional material. This will be direct transferred to denture with Dr. Adair.    Patient will be intelligible to listener on 75% of trials to allow for independence with communication of ADLs.  Improved resonance with addition of material to palatal side of the bulb. This will be transferred to denture with dr Adair.    Patient will improve trismus to allow for impressions for palatal obturator, improved speech and swallow function.: instructed masseter massage and stretching (deferred intraoral massage at this time.)    SLP to assist Dr. Adair with function of oral prosthesis related to speech/swallowing issues: ongoing    Patient will complete home exercise program daily: added masseter exercises.    Outpatient Education:  Adult Outpatient Education  Individual(s) Educated: Patient  Verbal Education : revewed/added to home  exercise plan  Risk and Benefits Discussed with Patient/Caregiver/Other: yes  Patient/Caregiver Demonstrated Understanding: yes  Plan of Care Discussed and Agreed Upon: yes  Patient Response to Education: Patient/Caregiver Verbalized Understanding of Information

## 2024-01-16 ENCOUNTER — TREATMENT (OUTPATIENT)
Dept: SPEECH THERAPY | Facility: HOSPITAL | Age: 84
End: 2024-01-16
Payer: MEDICARE

## 2024-01-16 DIAGNOSIS — R47.1 DYSARTHRIA: ICD-10-CM

## 2024-01-16 DIAGNOSIS — R13.12 OROPHARYNGEAL DYSPHAGIA: Primary | ICD-10-CM

## 2024-01-16 DIAGNOSIS — R13.10 DYSPHAGIA: ICD-10-CM

## 2024-01-16 PROCEDURE — 92610 EVALUATE SWALLOWING FUNCTION: CPT | Mod: GN

## 2024-01-16 PROCEDURE — 92526 ORAL FUNCTION THERAPY: CPT | Mod: GN

## 2024-01-16 ASSESSMENT — PAIN SCALES - GENERAL: PAINLEVEL_OUTOF10: 0 - NO PAIN

## 2024-01-16 ASSESSMENT — PAIN - FUNCTIONAL ASSESSMENT: PAIN_FUNCTIONAL_ASSESSMENT: 0-10

## 2024-01-17 NOTE — PROGRESS NOTES
"Speech-Language Pathology  Speech-Language Pathology Clinical Swallow Evaluation/ Treatment note    Patient Name: Yaritza Hua \"Jennifer\"  MRN: [id number]  Today's Date: 1/16/2024      Time Calculation  Start Time: 1020  Stop Time: 1120  Time Calculation (min): 60 min      Current Problem:   1. Oropharyngeal dysphagia        2. Dysphagia  Follow Up In Speech Therapy      3. Dysarthria  Follow Up In Speech Therapy            Recommendations:  NPO, main source of nutrition via PEG.  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  Trismus: 27mm, goal: 35+mm  Lingual ROM: limited lateralization, minimal elevation  Labial ROM: adequate    This evaluation was completed with intent of assessing oral food tolerance. Patient trialed cherry ice and nectar thick liquids. Noted slow oral manipulation, yet adequate. No oral residue. Multiple swallows per bolus indicating pharyngeal weakness. Weak throat clearing with all tested textures. Patient would benefit from aggressive dysphagia treatment with goal solidified following completion of MBS. Patient would like to work with SLP a few sessions prior to MBS as she has not had any oral intake since February 2023.    Prognosis: Good    Treatment Tolerance: Patient tolerated treatment well  Strengths: Cognition, Motivation    Treatment provided:  Instructed effortful swallow via sEMG. Trialed nectar thick liquids and cherry ice at target strength. Unit able to read through fibrotic anterior neck tissue. Following instruction of strategies, patient able to increase effort and meet target strength on 15 trials.     Lengthy education about Mao water protocol. Patient understands oral care, " aspiration risks and wishes to follow guidelines for free water.      Plan:  GOALS:     Dysphagia:  Patient will tolerate modified oral foods for pleasure without overt s/s of aspiration: n/a     Patient will participate in sEMG to assist with effortful swallow strategy as indicated: see above     Patient will participate with Neuromuscular Electrical Stimulation in conjunction with PO intake to improve pharyngeal strength as indicated:n/a     Patient will participate with Deep Pharyngeal Neuromuscular electrical stimulation to improve oral strength and sensation as indicated: hold until palatal augmentation complete per patient request    5. Patient will follow oral care guidelines and drink free water per protocol at home.      Intelligibility:  Patient will articulate target phonemes with 75% intelligibility at sentence level: n/a prior data: much improved intelligibility following addition of isofunctional material. This will be direct transferred to denture with Dr. Adair.     Patient will be intelligible to listener on 75% of trials to allow for independence with communication of ADLs.  N/a, prior data  Improved resonance with addition of material to palatal side of the bulb. This will be transferred to denture with dr Adair.     Patient will improve trismus to allow for impressions for palatal obturator, improved speech and swallow function.: instructed trismus exercise to complete daily.     SLP to assist Dr. Adair with function of oral prosthesis related to speech/swallowing issues: ongoing     Patient will complete home exercise program daily: added masseter exercises.    SLP Frequency: 1x per week  Duration: 30 days  Discussed POC: Patient  Discussed Risks/Benefits: Patient  Patient/Caregiver Agreeable: Yes      Subjective   Patient is willing to try foods orally now that palatal prosthesis is completed, thus this evaluation was for oral intake assessment and updated goal planning.      General  Visit Information:  Reason for Referral: patient would like to eat by mouth again  Past Medical History Relevant to Rehab: Garry Adair  Certification Period Start Date: 12/27/23  Certification Period End Date: 01/26/24  Number of Authorized Treatments : MN      Objective     Pain:  Pain Assessment: 0-10  Pain Score: 0 - No pain     SLP Outcome Measures:      EAT 10 score: 36, severe problem    Outpatient Education:  Adult Outpatient Education  Individual(s) Educated: Patient  Verbal Education : educated/instructed Mao water protocol  Risk and Benefits Discussed with Patient/Caregiver/Other: yes  Patient/Caregiver Demonstrated Understanding: yes  Plan of Care Discussed and Agreed Upon: yes  Patient Response to Education: Patient/Caregiver Verbalized Understanding of Information

## 2024-01-23 ENCOUNTER — TREATMENT (OUTPATIENT)
Dept: SPEECH THERAPY | Facility: HOSPITAL | Age: 84
End: 2024-01-23
Payer: MEDICARE

## 2024-01-23 DIAGNOSIS — R47.1 DYSARTHRIA: ICD-10-CM

## 2024-01-23 DIAGNOSIS — R13.12 OROPHARYNGEAL DYSPHAGIA: Primary | ICD-10-CM

## 2024-01-23 DIAGNOSIS — R13.10 DYSPHAGIA: ICD-10-CM

## 2024-01-23 PROCEDURE — 92526 ORAL FUNCTION THERAPY: CPT | Mod: GN

## 2024-01-23 ASSESSMENT — PAIN SCALES - GENERAL: PAINLEVEL_OUTOF10: 0 - NO PAIN

## 2024-01-23 ASSESSMENT — PAIN - FUNCTIONAL ASSESSMENT: PAIN_FUNCTIONAL_ASSESSMENT: 0-10

## 2024-01-23 NOTE — PROGRESS NOTES
"Speech-Language Pathology    Outpatient Speech-Language Pathology Treatment     Patient Name: Yaritza Hua \"Jennifer\"  MRN: [id number]  Today's Date: 1/23/2024     Time Calculation  Start Time: 1110  Stop Time: 1200  Time Calculation (min): 50 min    SLP Assessment:  Patient has been completing Mao water protocol prior to small amount of oral intake of water during the day. Patient feels \"it goes down fast and sometimes I cough\". She reports increasing dryness with use of hydrogen peroxide/water solution as per protocol. SLP and Patient spoke with Dr Adair who suggested continuing with protocol, using xylitol nasal spray, adding citrus/lemon to water when drinking. Also suggested adding baking soda to water (1TBS to 1L of water).  Patient aware of aspiration risks, agreeable to MBS in the near future, and would like to continue with small amounts of oral intake at home. She continues to practice home exercises daily. Much improved swallow mechanics today as measured via sEMG.    Prognosis: Good  Treatment Tolerance: Patient tolerated treatment well  Strengths: Cognition, Motivation      Plan:  SLP Frequency: 1x per week  Duration: 30 days  Discussed POC: Patient  Discussed Risks/Benefits: Patient  Patient/Caregiver Agreeable: Yes      Subjective   Patient pleasant and eager to begin session.    General Visit Information:   Certification Period Start Date: 12/27/23  Certification Period End Date: 01/26/24  Number of Authorized Treatments : MN  Total Number of Visits :  (5/10)      Pain Assessment:   Pain Assessment: 0-10  Pain Score: 0 - No pain      Objective   GOALS:     Dysphagia:  Patient will tolerate modified oral foods for pleasure without overt s/s of aspiration: trialed at home water with Mao protocol, had some coughing.      Patient will participate in sEMG to assist with effortful swallow strategy as indicated: much improved effortful swallow and mechanics noted today via sEMG. Trialed applesauce " "(1oz) without overt s/s of aspiration. Educated regarding bolus size and patient tolerated 1/2 tsp well.     Patient will participate with Neuromuscular Electrical Stimulation in conjunction with PO intake to improve pharyngeal strength as indicated:n/a     Patient will participate with Deep Pharyngeal Neuromuscular electrical stimulation to improve oral strength and sensation as indicated: n/a     5. Patient will follow oral care guidelines and drink free water per protocol at home: following guidelines.     Intelligibility:  Patient will articulate target phonemes with 75% intelligibility at sentence level: n/a prior data: much improved intelligibility following addition of isofunctional material. This will be direct transferred to denture with Dr. Adair.     Patient will be intelligible to listener on 75% of trials to allow for independence with communication of ADLs.  N/a, prior data  Improved resonance with addition of material to palatal side of the bulb. This will be transferred to denture with dr Adair.     Patient will improve trismus to allow for impressions for palatal obturator, improved speech and swallow function.: feels her \"mouth is opening wider\"     SLP to assist Dr. Adair with function of oral prosthesis related to speech/swallowing issues: ongoing     Patient will complete home exercise program daily: completing daily       Therapeutic Swallow:  Liquid Diet Recommendations: Mao free water protocol after oral care    Outpatient Education:  Adult Outpatient Education  Individual(s) Educated: Patient  Verbal Education : reviewed POC and goals, aspiration risks with PO intake.  Risk and Benefits Discussed with Patient/Caregiver/Other: yes  Patient/Caregiver Demonstrated Understanding: yes  Plan of Care Discussed and Agreed Upon: yes  Patient Response to Education: Patient/Caregiver Verbalized Understanding of Information  "

## 2024-01-24 ENCOUNTER — LAB (OUTPATIENT)
Dept: LAB | Facility: LAB | Age: 84
End: 2024-01-24
Payer: MEDICARE

## 2024-01-24 DIAGNOSIS — E03.9 ACQUIRED HYPOTHYROIDISM: ICD-10-CM

## 2024-01-24 LAB — TSH SERPL-ACNC: 2.05 MIU/L (ref 0.44–3.98)

## 2024-01-24 PROCEDURE — 84443 ASSAY THYROID STIM HORMONE: CPT

## 2024-01-24 PROCEDURE — 36415 COLL VENOUS BLD VENIPUNCTURE: CPT

## 2024-01-31 ENCOUNTER — TREATMENT (OUTPATIENT)
Dept: SPEECH THERAPY | Facility: HOSPITAL | Age: 84
End: 2024-01-31
Payer: MEDICARE

## 2024-01-31 DIAGNOSIS — R47.1 DYSARTHRIA: Primary | ICD-10-CM

## 2024-01-31 DIAGNOSIS — R13.10 DYSPHAGIA: ICD-10-CM

## 2024-01-31 PROCEDURE — 92526 ORAL FUNCTION THERAPY: CPT | Mod: GN

## 2024-01-31 ASSESSMENT — PAIN - FUNCTIONAL ASSESSMENT: PAIN_FUNCTIONAL_ASSESSMENT: 0-10

## 2024-01-31 ASSESSMENT — PAIN SCALES - GENERAL: PAINLEVEL_OUTOF10: 0 - NO PAIN

## 2024-01-31 NOTE — PROGRESS NOTES
"Speech-Language Pathology    Outpatient Speech-Language Pathology Treatment  30 day Progress Update     Patient Name: Yaritza Hua \"Jennifer\"  MRN: [id number]  Today's Date: 1/31/2024     Time Calculation  Start Time: 1015  Stop Time: 1110  Time Calculation (min): 55 min        SLP Assessment:  Patient continues to use Xlear nasal spray per Dr. Adair suggestion and feels it help retain nasal moisture. She completes lingual stretching daily. Throughout the week since last visit patient tried applesauce, whipped yogurt and a milkshake via spoon. Applesauce tolerated well oral and pharyngeally, milkshake needed to \"melt\" then was tolerated pharyngeal. Whipped yogurt was too light, unable to move out of oral cavity despite liquid chaser. Instructed lingual strengthening exercise, modified IOPI and constructed ARK-J to address trismus. She stated she is not \"ready\" to complete an MBS yet, would like to continue with PO trials with SLP.    Progressing towards goals.    Prognosis: Good  Treatment Tolerance: Patient tolerated treatment well  Strengths: Cognition, Motivation     Plan:  SLP Frequency: 1x per week  Duration: 30 days  Discussed POC: Patient  Discussed Risks/Benefits: Patient  Patient/Caregiver Agreeable: Yes      Subjective   Patient pleasant.    General Visit Information:   Certification Period Start Date: 01/31/24  Certification Period End Date: 03/01/24  Number of Authorized Treatments : MN  Total Number of Visits :  (1/4)      Pain Assessment:   Pain Assessment: 0-10  Pain Score: 0 - No pain      Objective     Updated GOALS:  Start Date: 1/31/24  Dysphagia:  Patient will tolerate modified oral foods for pleasure without overt s/s of aspiration: trialed at home water with Mao protocol, had some coughing.      Patient will participate in sEMG to assist with effortful swallow strategy as indicated: much improved effortful swallow and mechanics noted today via sEMG. Trialed applesauce (1oz) without overt " s/s of aspiration. Educated regarding bolus size and patient tolerated 1/2 tsp well.     Patient will participate with Neuromuscular Electrical Stimulation in conjunction with PO intake to improve pharyngeal strength as indicated:n/a     Patient will participate with Deep Pharyngeal Neuromuscular electrical stimulation to improve oral strength and sensation as indicated: n/a     5. Patient will follow oral care guidelines and drink free water per protocol at home: following guidelines.     Intelligibility:  Patient will articulate target phonemes with 75% intelligibility at sentence level: n/a prior data: much improved intelligibility following addition of isofunctional material. This will be direct transferred to denture with Dr. Adair.     Patient's resonance will improve with use of palatal obturator.    Patient will improve trismus to allow for impressions for palatal obturator, improved speech and swallow function.: constructed ARK-J     SLP to assist Dr. Adair with function of oral prosthesis related to speech/swallowing issues: ongoing     Patient will complete home exercise program daily: completing daily lingual stretches. Added ARK-J and lingual strengthening exercise today. Equipment constructed and provided. Patient noted resistance with lingual strength and stretching in masseter with ARK-J      Therapeutic Swallow:  Liquid Diet Recommendations: Mao free water protocol after oral care    Outpatient Education:  Adult Outpatient Education  Individual(s) Educated: Patient  Verbal Education : reviewed POC and goals, aspiration risks with PO intake.  Risk and Benefits Discussed with Patient/Caregiver/Other: yes  Patient/Caregiver Demonstrated Understanding: yes  Plan of Care Discussed and Agreed Upon: yes  Patient Response to Education: Patient/Caregiver Verbalized Understanding of Information

## 2024-01-31 NOTE — LETTER
January 31, 2024    Garry Adair DDS  [address] [facility]   [address] [place] [id number]    Patient: Jennifer Hua   YOB: 1940   Date of Visit: 1/31/2024       Dear Garry Adair DDS  [address] [facility]  [address] [place] [id number]    The attached plan of care is being sent to you because your patient’s medical reimbursement requires that you certify the plan of care. Your signature is required to allow uninterrupted insurance coverage.      You may indicate your approval by signing below and faxing this form back to us at No information on file..    Please call Dept: [phone] with any questions or concerns.    Thank you for this referral,        JANETTE Pino  [facility]  [address] [facility]  [facility] [id number] [phone]    Payer: Payor: [facility] MEDICARE / Plan: [employer] [facility] MEDICARE / Product Type: *No Product type* /                                                                         Date:     Dear JANETTE Pino,     Re: Ms. Yaritza Hua, MRN:[id number]    I certify that I have reviewed the attached plan of care and it is medically necessary for Ms. Yaritza Hua (1940) who is under my care.          ______________________________________                    _________________  Provider name and credentials                                           Date and time                                                                                           Plan of Care 1/31/24   Effective from: 1/31/2024  Effective to: 3/1/2024    Active  Plan ID: [id number]           Participants as of 1/31/2024    Name Type Comments Contact Info    Garry Adair DDS Referring Physician Please review and sign.       Last Plan Note     Author: JANETTE Pino Status: Sign when Signing Visit Last edited: 1/31/2024 10:00 AM       Speech-Language Pathology    Outpatient Speech-Language Pathology  "Treatment  30 day Progress Update     Patient Name: Yaritza Hua \"Jennifer\"  MRN: [id number]  Today's Date: 1/31/2024     Time Calculation  Start Time: 1015  Stop Time: 1110  Time Calculation (min): 55 min        SLP Assessment:  Patient continues to use Xlear nasal spray per Dr. Adair suggestion and feels it help retain nasal moisture. She completes lingual stretching daily. Throughout the week since last visit patient tried applesauce, whipped yogurt and a milkshake via spoon. Applesauce tolerated well oral and pharyngeally, milkshake needed to \"melt\" then was tolerated pharyngeal. Whipped yogurt was too light, unable to move out of oral cavity despite liquid chaser. Instructed lingual strengthening exercise, modified IOPI and constructed ARK-J to address trismus. She stated she is not \"ready\" to complete an MBS yet, would like to continue with PO trials with SLP.    Progressing towards goals.    Prognosis: Good  Treatment Tolerance: Patient tolerated treatment well  Strengths: Cognition, Motivation     Plan:  SLP Frequency: 1x per week  Duration: 30 days  Discussed POC: Patient  Discussed Risks/Benefits: Patient  Patient/Caregiver Agreeable: Yes      Subjective   Patient pleasant.    General Visit Information:   Certification Period Start Date: 01/31/24  Certification Period End Date: 03/01/24  Number of Authorized Treatments : MN  Total Number of Visits :  (1/4)      Pain Assessment:   Pain Assessment: 0-10  Pain Score: 0 - No pain      Objective     Updated GOALS:  Start Date: 1/31/24  Dysphagia:  Patient will tolerate modified oral foods for pleasure without overt s/s of aspiration: trialed at home water with Mao protocol, had some coughing.      Patient will participate in sEMG to assist with effortful swallow strategy as indicated: much improved effortful swallow and mechanics noted today via sEMG. Trialed applesauce (1oz) without overt s/s of aspiration. Educated regarding bolus size and patient " tolerated 1/2 tsp well.     Patient will participate with Neuromuscular Electrical Stimulation in conjunction with PO intake to improve pharyngeal strength as indicated:n/a     Patient will participate with Deep Pharyngeal Neuromuscular electrical stimulation to improve oral strength and sensation as indicated: n/a     5. Patient will follow oral care guidelines and drink free water per protocol at home: following guidelines.     Intelligibility:  Patient will articulate target phonemes with 75% intelligibility at sentence level: n/a prior data: much improved intelligibility following addition of isofunctional material. This will be direct transferred to denture with Dr. Adair.     Patient's resonance will improve with use of palatal obturator.    Patient will improve trismus to allow for impressions for palatal obturator, improved speech and swallow function.: constructed ARK-J     SLP to assist Dr. Adair with function of oral prosthesis related to speech/swallowing issues: ongoing     Patient will complete home exercise program daily: completing daily lingual stretches. Added ARK-J and lingual strengthening exercise today. Equipment constructed and provided. Patient noted resistance with lingual strength and stretching in masseter with ARK-J      Therapeutic Swallow:  Liquid Diet Recommendations: Mao free water protocol after oral care    Outpatient Education:  Adult Outpatient Education  Individual(s) Educated: Patient  Verbal Education : reviewed POC and goals, aspiration risks with PO intake.  Risk and Benefits Discussed with Patient/Caregiver/Other: yes  Patient/Caregiver Demonstrated Understanding: yes  Plan of Care Discussed and Agreed Upon: yes  Patient Response to Education: Patient/Caregiver Verbalized Understanding of Information

## 2024-02-02 ENCOUNTER — TELEPHONE (OUTPATIENT)
Dept: OTOLARYNGOLOGY | Facility: HOSPITAL | Age: 84
End: 2024-02-02
Payer: MEDICARE

## 2024-02-02 DIAGNOSIS — E03.9 ACQUIRED HYPOTHYROIDISM: ICD-10-CM

## 2024-02-02 NOTE — TELEPHONE ENCOUNTER
"I received the following email this morning at 8:55 a.m. from Jennifer:    \"Good morning, Bee,    Saw in My Chart that my TSH was in within the normal range.  Does this mean that the 50 Mcg dose of the Levothyroxine is what I should remain on for the duration?  If this is the case, could you please send a prescription for it to [facility]?  I'm almost out of that one month's prescription that you had me take to see if I would need the higher dosage.      Thank you so much.  Hope you have a nice weekend.    Jennifer Melita\"    I reviewed the chart and her 1/24/24 TSH was 2.05.  Her current dose of levothyroxine is perfect.  A refill will be sent in as requested.    I sent her an email back stating that this is the appropriate dose for now.  Overtime her dosing needs may change which is why she will have her TSH level checked at least once a year of if she reports symptoms concerning for hyper or hypothyroidism.    Prescription pended for Dr. Camacho's authorization..      "

## 2024-02-03 RX ORDER — LEVOTHYROXINE SODIUM 50 UG/1
50 TABLET ORAL
Qty: 90 TABLET | Refills: 3 | Status: SHIPPED | OUTPATIENT
Start: 2024-02-03

## 2024-02-08 ENCOUNTER — TREATMENT (OUTPATIENT)
Dept: SPEECH THERAPY | Facility: HOSPITAL | Age: 84
End: 2024-02-08
Payer: MEDICARE

## 2024-02-08 DIAGNOSIS — R13.10 DYSPHAGIA: Primary | ICD-10-CM

## 2024-02-08 DIAGNOSIS — R47.1 DYSARTHRIA: ICD-10-CM

## 2024-02-08 DIAGNOSIS — R13.12 OROPHARYNGEAL DYSPHAGIA: ICD-10-CM

## 2024-02-08 PROCEDURE — 92526 ORAL FUNCTION THERAPY: CPT | Mod: GN

## 2024-02-08 ASSESSMENT — PAIN SCALES - GENERAL: PAINLEVEL_OUTOF10: 0 - NO PAIN

## 2024-02-08 ASSESSMENT — PAIN - FUNCTIONAL ASSESSMENT: PAIN_FUNCTIONAL_ASSESSMENT: 0-10

## 2024-02-08 NOTE — PROGRESS NOTES
"Speech-Language Pathology    Outpatient Speech-Language Pathology Treatment     Patient Name: Yaritza Hua \"Carmen"  MRN: [id number]  Today's Date: 2/8/2024     Time Calculation  Start Time: 1120  Stop Time: 1210  Time Calculation (min): 50 min      SLP Assessment:        Prognosis: Good  Treatment Tolerance: Patient tolerated treatment well  Strengths: Cognition, Motivation       Plan:  SLP TX Plan: Continue Plan of Care  SLP Plan: Skilled SLP  SLP Frequency: 1x per week  Duration: 30 days  Discussed POC: Patient  Discussed Risks/Benefits: Patient  Patient/Caregiver Agreeable: Yes      Subjective         General Visit Information:   Certification Period Start Date: 01/31/24  Certification Period End Date: 03/01/24  Number of Authorized Treatments : MN  Total Number of Visits :  (2/4)      Pain Assessment:   Pain Assessment: 0-10  Pain Score: 0 - No pain      Objective       Therapeutic Swallow:  Liquid Diet Recommendations: Mao free water protocol after oral care      Motor Speech:          Augmentative Device Training:          Cognitive Linguistics:         Language Expression:         Language Comprehension:         Voice:         Hearing:         Fluency:         Comments:         SLP Outcome Measures:  {SLP Outcome Measures:92080}           Outpatient Education:  Adult Outpatient Education  Individual(s) Educated: Patient  Verbal Education : reviewed POC and goals, aspiration risks with PO intake.  Risk and Benefits Discussed with Patient/Caregiver/Other: yes  Patient/Caregiver Demonstrated Understanding: yes  Plan of Care Discussed and Agreed Upon: yes  Patient Response to Education: Patient/Caregiver Verbalized Understanding of Information      Inpatient:  Education Documentation  No documentation found.  Education Comments  No comments found.        Consultations/Referrals/Coordination of Services: ***                     "

## 2024-02-09 NOTE — PROGRESS NOTES
"Speech-Language Pathology    Outpatient Speech-Language Pathology Treatment     Patient Name: Yaritza Hua \"Jennifer\"  MRN: [id number]  Today's Date: 2/8/2024     Time Calculation  Start Time: 1120  Stop Time: 1210  Time Calculation (min): 50 min      SLP Assessment:  Patient tried pudding and mashed potatoes and \"it didn't go well at home. It stuck to the roof of my mouth\". Patient is completing home exercises including for trismus. She feels increased anterior neck tension, however, following re education on benefits of PT to address fibrosis, she wishes to try a few more week independently prior to asking for a PT order. Continued use of Mao water protocol to minimize oral bacteria and ARK-J to reduce trismus. Also feels Xlear nasal spray is increasing oral moisture.    Prognosis: Good  Treatment Tolerance: Patient tolerated treatment well  Strengths: Cognition, Motivation       Plan:  SLP TX Plan: Continue Plan of Care  SLP Plan: Skilled SLP  SLP Frequency: 1x per week  Duration: 30 days  Discussed POC: Patient  Discussed Risks/Benefits: Patient  Patient/Caregiver Agreeable: Yes      Subjective     Patient pleasant and eager to learn.    General Visit Information:   Certification Period Start Date: 01/31/24  Certification Period End Date: 03/01/24  Number of Authorized Treatments : MN  Total Number of Visits :  (2/4)      Pain Assessment:   Pain Assessment: 0-10  Pain Score: 0 - No pain      Objective     GOALS:  Start Date: 1/31/24  Dysphagia:  Patient will tolerate modified oral foods for pleasure without overt s/s of aspiration: tried several tsps and cups sips of nectar, honey thick, pudding. Patient noted to have slow oral phase, however, only scattered oral residue noted throughout, reduced slightly with liquids chaser. Wet vocal quality indicated overt s/s of aspiration.   Patient agreeable to MBS for further evaluation. Target MBS: 2/15/24\    Patient will participate in sEMG to assist with effortful " swallow strategy as indicated: GOAL MET     Patient will participate with Neuromuscular Electrical Stimulation in conjunction with PO intake to improve pharyngeal strength as indicated:n/a     Patient will participate with Deep Pharyngeal Neuromuscular electrical stimulation to improve oral strength and sensation as indicated: n/a     5. Patient will follow oral care guidelines and drink free water per protocol at home: following guidelines.     Intelligibility:  Patient will articulate target phonemes with 75% intelligibility at sentence level: n/a prior data: much improved intelligibility following addition of isofunctional material. This will be direct transferred to denture with Dr. Adair.     Patient's resonance will improve with use of palatal obturator.     Patient will improve trismus to allow for impressions for palatal obturator, improved speech and swallow function.: constructed ARK-J     SLP to assist Dr. Adair with function of oral prosthesis related to speech/swallowing issues: ongoing     Patient will complete home exercise program daily: completing daily lingual stretches. Added ARK-J and lingual strengthening exercise today. Equipment constructed and provided. Patient noted resistance with lingual strength and stretching in masseter with ARK-J       Outpatient Education:  Adult Outpatient Education  Individual(s) Educated: Patient  Verbal Education : reviewed POC and goals, aspiration risks with PO intake.  Risk and Benefits Discussed with Patient/Caregiver/Other: yes  Patient/Caregiver Demonstrated Understanding: yes  Plan of Care Discussed and Agreed Upon: yes  Patient Response to Education: Patient/Caregiver Verbalized Understanding of Information

## 2024-02-15 ENCOUNTER — HOSPITAL ENCOUNTER (OUTPATIENT)
Dept: RADIOLOGY | Facility: HOSPITAL | Age: 84
Discharge: HOME | End: 2024-02-15
Payer: MEDICARE

## 2024-02-15 ENCOUNTER — APPOINTMENT (OUTPATIENT)
Dept: RADIOLOGY | Facility: HOSPITAL | Age: 84
End: 2024-02-15
Payer: MEDICARE

## 2024-02-15 ENCOUNTER — APPOINTMENT (OUTPATIENT)
Dept: SPEECH THERAPY | Facility: HOSPITAL | Age: 84
End: 2024-02-15
Payer: MEDICARE

## 2024-02-15 DIAGNOSIS — R13.12 OROPHARYNGEAL DYSPHAGIA: ICD-10-CM

## 2024-02-15 PROCEDURE — 74230 X-RAY XM SWLNG FUNCJ C+: CPT

## 2024-02-15 PROCEDURE — 92611 MOTION FLUOROSCOPY/SWALLOW: CPT | Mod: GN

## 2024-02-15 PROCEDURE — 3430000001 HC RX 343 DIAGNOSTIC RADIOPHARMACEUTICALS: Performed by: OTOLARYNGOLOGY

## 2024-02-15 PROCEDURE — 2500000001 HC RX 250 WO HCPCS SELF ADMINISTERED DRUGS (ALT 637 FOR MEDICARE OP): Performed by: OTOLARYNGOLOGY

## 2024-02-15 RX ADMIN — BARIUM SULFATE 25 ML: 400 SUSPENSION ORAL at 11:43

## 2024-02-15 RX ADMIN — BARIUM SULFATE 5 ML: 400 SUSPENSION ORAL at 11:48

## 2024-02-15 RX ADMIN — BARIUM SULFATE 5 ML: 400 PASTE ORAL at 11:49

## 2024-02-15 NOTE — PROCEDURES
"Speech-Language Pathology        Modified Barium Swallow Study     Patient Name: Yaritza Hua \"Jennifer\"  MRN: [id number]  : 1940  Today's Date: 02/15/24     Time Calculation  Start Time: 1130  Stop Time: 1155  Time Calculation (min): 25 min    Recommendations:  NPO with PEG for nutrition, hydration, medications. Will complete PO trials with SLP during dysphagia therapy.  2.   Consider follow up with Dr. Camacho for endoscopy to further assess velopharyngeal insufficiency.  3.   Follow up with Dr. Adair for functional adjustments to palatal prosthesis bulb    Assessment/Impression:    Full detailed SLP/Radiologist Modified Barium Swallow study report can be found under Chart Review tab, Imaging tab and  titled \"[place] Modified Barium Swallow Study\"      Dysphagia etiology is multifactorial.   1. Patient has general oral and pharyngeal weakness.   2. Patient would benefit from endoscopy with Dr. Camacho to further assess velopharyngeal insufficiency to assist with construction of prosthesis bulb.   3. Patient will benefit from functional changes with palatal prosthesis to improve swallow function.   4. Patient will benefit from aggressive dysphagia therapy to improve oropharyngeal function.   5. Will need repeat MBS following functional/construction adjustments wtih prosthesis.    Patient noted to aspirate silently with nectar and honey thick liquids. Chronic aspiration of residue observed. Marked oral and pharyngeal residue contributing to chronic aspiration. Slow oral emptying with pudding, no aspiration yet at risk when clearing of oral cavity.    Details of MBS Study:    Patient tested with varibar consistencies of nectar and honey thick liquids and pudding. Due to severity of dysphagia and PEG reliance MBS protocol was deferred.    Oral phase:   1. Slow mashing with complete recollection.  2. Slowed tongue motion.  3. Collection of oral residue, slightly reduced with reswallows.  4. Swallow initiation at the " posterior laryngeal surface of the epiglottis    Pharyngeal phase:   1. Escape of bolus to nasopharynx.  2. Partial superior laryngeal elevation  3. Nol anterior hyoid movement  4. No epiglottic inversion  5. Isolated episode of narrow column of contrast in laryngeal vestibule x1 with thin liquids only. Contrast enters airway above vocal cords with no effort to eject.  6. Diminished pharyngeal stripping wave  7. Partial distension/duration of pharyngoesophageal segment  8. Wide column of contrast between tongue base and pharyngeal wall.  9.Collection of residue in pharynx with nectar and honey thick liquids. Majority with pudding thick liquids. Nectar thick liquid chaser did not reduce residue with pudding.    Plan:  Treatment/Interventions: Pharyngeal exercises, Oral motor exercises, Patient/family education, Bolus trials  SLP Plan: Skilled SLP warranted  SLP Frequency: 2x per week  Duration: 90 days    Discussed POC: Patient  Discussed Risks/Benefits: Yes  Patient/Caregiver Agreeable: Yes      GOALS:  Short term goals:   1. Patient will tolerate oral trials with SLP without pulmonary compromise.  2. Patient will participate in sEMG to assist with effortful swallow strategy.  3. Patient will participate with Neuromuscular Electrical Stimulation in conjunction with PO intake to improve pharyngeal strength as appropriate.  4. Patient will participate with Deep Pharyngeal Neuromuscular electrical stimulation to improve oral strength and sensation.  5. Patient will improve trismus to allow for impressions for palatal obturator, improved speech and swallow function.  6. SLP to assist Dr. Adair with function of oral prosthesis related to speech/swallowing issues.  7. Patient will complete home exercise program daily.     Long term goals: To improve overall swallow function.    Pain:   Rating 0-10: 0    Education:   Pt. Educated on results of MBS study, recommendations, aspiration risks and recommended safe swallow  strategies

## 2024-02-16 DIAGNOSIS — R25.2 TRISMUS: ICD-10-CM

## 2024-02-16 NOTE — PROGRESS NOTES
SLP sent updated information to Dr. Camacho about Ms. Hua.  They recommended PT for trismus--dry needing and manual therapy.  Dr. Camacho was okay with the recommendation and asked that a PT referral be placed.    Done.

## 2024-02-22 ENCOUNTER — TREATMENT (OUTPATIENT)
Dept: SPEECH THERAPY | Facility: HOSPITAL | Age: 84
End: 2024-02-22
Payer: MEDICARE

## 2024-02-22 DIAGNOSIS — R47.1 DYSARTHRIA: ICD-10-CM

## 2024-02-22 DIAGNOSIS — R13.12 OROPHARYNGEAL DYSPHAGIA: ICD-10-CM

## 2024-02-22 DIAGNOSIS — R13.10 DYSPHAGIA: Primary | ICD-10-CM

## 2024-02-22 PROCEDURE — 92526 ORAL FUNCTION THERAPY: CPT | Mod: GN

## 2024-02-22 ASSESSMENT — PAIN - FUNCTIONAL ASSESSMENT: PAIN_FUNCTIONAL_ASSESSMENT: 0-10

## 2024-02-22 ASSESSMENT — PAIN SCALES - GENERAL: PAINLEVEL_OUTOF10: 0 - NO PAIN

## 2024-02-22 NOTE — PROGRESS NOTES
"Speech-Language Pathology    Outpatient Speech-Language Pathology Treatment     Patient Name: Yaritza Hua \"Jennifer\"  MRN: [id number]  Today's Date: 2/22/2024     Time Calculation  Start Time: 1110  Stop Time: 1200  Time Calculation (min): 50 min    SLP Assessment:  Reviewed MBS recording and plan of care to include adjusting the bulb of her prosthesis potentially following endoscopy with Dr. Camacho 3/12/24, aggressive dysphagia therapy to include NMES, DPNS and home exercise program. Patient is in agreement with the plan. She would like to make dysphagia her primary goal. She feels her speech is functional. She would like to attend therapy 2x weekly.    Prognosis: Good  Treatment Tolerance: Patient tolerated treatment well  Strengths: Cognition, Motivation       Plan:  SLP TX Plan: Continue Plan of Care  SLP Plan: Skilled SLP  SLP Frequency: 1x per week  Duration: 30 days  Discussed POC: Patient  Discussed Risks/Benefits: Patient  Patient/Caregiver Agreeable: Yes      Subjective   Patient stated she is frustrated with herself because of the results of her MBS. She would like to focus on swallow as her main goal.      General Visit Information:   Certification Period Start Date: 01/31/24  Certification Period End Date: 03/01/24  Number of Authorized Treatments : MN  Total Number of Visits :  (3/4)      Pain Assessment:   Pain Assessment: 0-10  Pain Score: 0 - No pain      Objective     GOALS:  Short term goals:   1. Patient will tolerate oral trials with SLP without pulmonary compromise.  2. Patient will participate in sEMG to assist with effortful swallow strategy.  3. Patient will participate with Neuromuscular Electrical Stimulation in conjunction with PO intake to improve pharyngeal strength as appropriate.  4. Patient will participate with Deep Pharyngeal Neuromuscular electrical stimulation to improve oral strength and sensation.  5. Patient will improve trismus to allow for impressions for palatal obturator, " improved speech and swallow function.  6. SLP to assist Dr. Adair with function of oral prosthesis related to speech/swallowing issues.  7. Patient will complete home exercise program daily.      Long term goals: To improve overall swallow function.      Outpatient Education:  Adult Outpatient Education  Individual(s) Educated: Patient  Verbal Education : reviewed POC and goals, aspiration risks with PO intake.  Risk and Benefits Discussed with Patient/Caregiver/Other: yes  Patient/Caregiver Demonstrated Understanding: yes  Plan of Care Discussed and Agreed Upon: yes  Patient Response to Education: Patient/Caregiver Verbalized Understanding of Information

## 2024-02-29 ENCOUNTER — TREATMENT (OUTPATIENT)
Dept: SPEECH THERAPY | Facility: HOSPITAL | Age: 84
End: 2024-02-29
Payer: MEDICARE

## 2024-02-29 DIAGNOSIS — R47.1 DYSARTHRIA: ICD-10-CM

## 2024-02-29 DIAGNOSIS — R13.10 DYSPHAGIA: ICD-10-CM

## 2024-02-29 DIAGNOSIS — R13.12 OROPHARYNGEAL DYSPHAGIA: Primary | ICD-10-CM

## 2024-02-29 PROCEDURE — 92526 ORAL FUNCTION THERAPY: CPT | Mod: GN

## 2024-02-29 ASSESSMENT — PAIN - FUNCTIONAL ASSESSMENT: PAIN_FUNCTIONAL_ASSESSMENT: 0-10

## 2024-02-29 ASSESSMENT — PAIN SCALES - GENERAL: PAINLEVEL_OUTOF10: 0 - NO PAIN

## 2024-03-01 NOTE — PROGRESS NOTES
"Speech-Language Pathology    Outpatient Speech-Language Pathology Treatment  30 Day progress update     Patient Name: Yaritza Hua \"Jennifer\"  MRN: [id number]  Today's Date: 2/29/2024     Time Calculation  Start Time: 1115  Stop Time: 1205  Time Calculation (min): 50 min    SLP Assessment:  Reviewed plan SLP discussed with DR. Adair. Will shorten bulb on obturator and lift it in attempt to allow for improved pharyngeal wall contraction. SLP will complete DPNS, sEMG and NMES to allow for optimal improvement with swallow function. Patient was given home exercise program for dysphagia to complete daily. She agrees with plan and increase in treatment to 2x weekly to assist with improvement.    Prognosis: Good  Treatment Tolerance: Patient tolerated treatment well  Strengths: Cognition, Motivation       Plan:  SLP TX Plan: Goals Adjusted  SLP Plan: Skilled SLP  SLP Frequency: 2x per week  Duration: 30 days  Discussed POC: Patient  Discussed Risks/Benefits: Patient  Patient/Caregiver Agreeable: Yes      Subjective   Pleasant today, due to weather, difficult drive in to therapy      General Visit Information:   Certification Period Start Date: 02/29/24  Certification Period End Date: 03/30/24  Number of Authorized Treatments : MN  Total Number of Visits :  (1/8)      Pain Assessment:   Pain Assessment: 0-10  Pain Score: 0 - No pain      Objective     UPDATED GOALS:  Short term goals:   1. Patient will tolerate oral trials with SLP without pulmonary compromise.  2. Patient will participate in sEMG to assist with effortful swallow strategy.  3. Patient will participate with Neuromuscular Electrical Stimulation in conjunction with PO intake to improve pharyngeal strength as appropriate.  4. Patient will participate with Deep Pharyngeal Neuromuscular electrical stimulation to improve oral strength and sensation.  4 packets completed following overview of protocol.  Lingual groove  minimal due to anatomy changes  Palatal " reflex - absent  Pharyngeal wall - 50% Rom on left due to anatomical changes.  5. Patient will improve trismus to allow for impressions for palatal obturator, improved speech and swallow function.  6. SLP to assist Dr. Adair with function of oral prosthesis related to speech/swallowing issues.  7. Patient will complete home exercise program daily- instructed сергей modified, hard swallow and CTAR exercises.     Long term goals: To improve overall swallow function.      Outpatient Education:  Adult Outpatient Education  Individual(s) Educated: Patient  Verbal Education : reviewed POC and goals, .  Risk and Benefits Discussed with Patient/Caregiver/Other: yes  Patient/Caregiver Demonstrated Understanding: yes  Plan of Care Discussed and Agreed Upon: yes  Patient Response to Education: Patient/Caregiver Verbalized Understanding of Information

## 2024-03-05 ENCOUNTER — TREATMENT (OUTPATIENT)
Dept: SPEECH THERAPY | Facility: HOSPITAL | Age: 84
End: 2024-03-05
Payer: MEDICARE

## 2024-03-05 DIAGNOSIS — R13.12 OROPHARYNGEAL DYSPHAGIA: Primary | ICD-10-CM

## 2024-03-05 DIAGNOSIS — R47.1 DYSARTHRIA: ICD-10-CM

## 2024-03-05 DIAGNOSIS — R13.10 DYSPHAGIA: ICD-10-CM

## 2024-03-05 PROCEDURE — 92526 ORAL FUNCTION THERAPY: CPT | Mod: GN

## 2024-03-05 ASSESSMENT — PAIN SCALES - GENERAL: PAINLEVEL_OUTOF10: 0 - NO PAIN

## 2024-03-05 ASSESSMENT — PAIN - FUNCTIONAL ASSESSMENT: PAIN_FUNCTIONAL_ASSESSMENT: 0-10

## 2024-03-05 NOTE — PROGRESS NOTES
"Speech-Language Pathology    Outpatient Speech-Language Pathology Treatment       Patient Name: Yaritza Hua \"Jennifer\"  MRN: [id number]  Today's Date: 3/5/2024     Time Calculation  Start Time: 1120  Stop Time: 1200  Time Calculation (min): 40 min    SLP Assessment:  Patient will see Dr Adair today to shorten bulb as discussed to improve pharyngeal contact. Patient quite excited as she was able to drink 1/2 cup of coffee with her daughter yesterday for the first time. She understands aspiration risks and added thickener to nectar. She practices home exercise daily and will begin PT to address anterior neck tension.    Prognosis: Good  Treatment Tolerance: Patient tolerated treatment well  Strengths: Cognition, Motivation       Plan:  SLP TX Plan: Continue Plan of Care  SLP Plan: Skilled SLP  SLP Frequency: 2x per week  Duration: 30 days  Discussed POC: Patient  Discussed Risks/Benefits: Patient  Patient/Caregiver Agreeable: Yes      Subjective   In good spirits.      General Visit Information:   Certification Period Start Date: 02/29/24  Certification Period End Date: 03/30/24  Number of Authorized Treatments : MN  Total Number of Visits :  (2/8)      Pain Assessment:   Pain Assessment: 0-10  Pain Score: 0 - No pain      Objective     GOALS:  Short term goals:   1. Patient will tolerate oral trials with SLP without pulmonary compromise.'  Progress: pt trialed 1/2 cup nectar thick coffee with daughter using strategies and tolerated well.  Status: progressing    2. Patient will participate in sEMG to assist with effortful swallow strategy.n/a    3. Patient will participate with Neuromuscular Electrical Stimulation in conjunction with PO intake to improve pharyngeal strength as appropriate.  Progress: Electrodes placed over the belly of the digastric during swallow exercises and functional trials.  Work: Rest 57:3   185 us  8.5 mA   11 min.   Trialed: nectar thick apple juice. Required cues to increase effort of " swallow. No overt s/s of aspiration. Completed сергей throughout.  Tolerated: well.  Status: progressing    4. Patient will participate with Deep Pharyngeal Neuromuscular electrical stimulation to improve oral strength and sensation. N/a   Prior data:  4 packets completed following overview of protocol.  Lingual groove  minimal due to anatomy changes  Palatal reflex - absent  Pharyngeal wall - 50% Rom on left due to anatomical changes.    5. Patient will improve trismus to allow for impressions for palatal obturator, improved speech and swallow function. N/a    6. SLP to assist Dr. Adair with function of oral prosthesis related to speech/swallowing issues: pt to see Dr Adair today for adjustment    7. Patient will complete home exercise program daily- instructed сергей modified, hard swallow and CTAR exercises  Progress: completing daily.  Status: progressing    Long term goals: To improve overall swallow function.      Outpatient Education:  Adult Outpatient Education  Individual(s) Educated: Patient  Verbal Education : reviewed POC and goals, .  Risk and Benefits Discussed with Patient/Caregiver/Other: yes  Patient/Caregiver Demonstrated Understanding: yes  Plan of Care Discussed and Agreed Upon: yes  Patient Response to Education: Patient/Caregiver Verbalized Understanding of Information

## 2024-03-06 ENCOUNTER — TREATMENT (OUTPATIENT)
Dept: SPEECH THERAPY | Facility: HOSPITAL | Age: 84
End: 2024-03-06
Payer: MEDICARE

## 2024-03-06 DIAGNOSIS — R13.10 DYSPHAGIA: ICD-10-CM

## 2024-03-06 DIAGNOSIS — R47.1 DYSARTHRIA: ICD-10-CM

## 2024-03-06 PROCEDURE — 92507 TX SP LANG VOICE COMM INDIV: CPT | Mod: GN

## 2024-03-06 ASSESSMENT — PAIN SCALES - GENERAL: PAINLEVEL_OUTOF10: 0 - NO PAIN

## 2024-03-06 ASSESSMENT — PAIN - FUNCTIONAL ASSESSMENT: PAIN_FUNCTIONAL_ASSESSMENT: 0-10

## 2024-03-06 NOTE — PROGRESS NOTES
"Speech-Language Pathology    Outpatient Speech-Language Pathology Treatment       Patient Name: Yaritza Hua \"Jennifer\"  MRN: [id number]  Today's Date: 3/6/2024     Time Calculation  Start Time: 1110  Stop Time: 1155  Time Calculation (min): 45 min    SLP Assessment:  Patient feels her speech quality is improved following adjustment of her prosthesis yesterday. She is happen with her vocal quality. SLP agrees with improved resonance. Pt tried 1/2 nanette doone cookie yesterday and had residue under tongue she had difficulty clearing with coffee thin liquid chaser. However she was excited to be able to masticate a cookie. She is aware of aspiration risks and wishes to continue with home PO trials.    Prognosis: Good  Treatment Tolerance: Patient tolerated treatment well  Strengths: Cognition, Motivation       Plan:  SLP TX Plan: Continue Plan of Care  SLP Plan: Skilled SLP  SLP Frequency: 2x per week  Duration: 30 days  Discussed POC: Patient  Discussed Risks/Benefits: Patient  Patient/Caregiver Agreeable: Yes      Subjective   Had adjustment with Dr Adair, bulb shortened to allow for increased pharyngeal wall ROM. Note improved vocal quality.      General Visit Information:   Certification Period Start Date: 02/29/24  Certification Period End Date: 03/30/24  Number of Authorized Treatments : MN  Total Number of Visits :  (3/8)      Pain Assessment:   Pain Assessment: 0-10  Pain Score: 0 - No pain      Objective     GOALS:  Short term goals:   1. Patient will tolerate oral trials with SLP without pulmonary compromise.'  Progress: pt trialed 1/2 loorna doone cookie and used a small amount of nectar thick coffee to clear oral cavity. She felt she tolerated the above well and is aware of aspiration risks.  Status: progressing    2. Patient will participate in sEMG to assist with effortful swallow strategy.n/a    3. Patient will participate with Neuromuscular Electrical Stimulation in conjunction with PO intake to improve " pharyngeal strength as appropriate.  Progress: Electrodes placed over the belly of the digastric during swallow exercises and functional trials.  Work: Rest 57:3   185 us  8.5 mA   11 min.   Trialed: Completed сергей.  Tolerated: well.  Status: progressing    4. Patient will participate with Deep Pharyngeal Neuromuscular electrical stimulation to improve oral strength and sensation.    Progress: 4 packets completed following overview of protocol due to pharyngeal wall redness.  Lingual groove  minimal due to anatomy changes  Palatal reflex - improved, noted twinge movements with light pressure.  Pharyngeal wall -minimal Rom today  Status: progressing    5. Patient will improve trismus to allow for impressions for palatal obturator, improved speech and swallow function. N/a    6. SLP to assist Dr. Adair with function of oral prosthesis related to speech/swallowing issues:   Progress: adjustment completed yesterday  Status: ongoing    7. Patient will complete home exercise program daily-   Progress: completing daily.  Status: progressing    Long term goals: To improve overall swallow function.      Outpatient Education:  Adult Outpatient Education  Individual(s) Educated: Patient  Verbal Education : reviewed home exercises  Risk and Benefits Discussed with Patient/Caregiver/Other: yes  Patient/Caregiver Demonstrated Understanding: yes  Plan of Care Discussed and Agreed Upon: yes  Patient Response to Education: Patient/Caregiver Verbalized Understanding of Information

## 2024-03-11 ENCOUNTER — TREATMENT (OUTPATIENT)
Dept: SPEECH THERAPY | Facility: HOSPITAL | Age: 84
End: 2024-03-11
Payer: MEDICARE

## 2024-03-11 DIAGNOSIS — R13.10 DYSPHAGIA: ICD-10-CM

## 2024-03-11 DIAGNOSIS — R47.1 DYSARTHRIA: Primary | ICD-10-CM

## 2024-03-11 PROCEDURE — 92526 ORAL FUNCTION THERAPY: CPT | Mod: GN

## 2024-03-11 ASSESSMENT — PAIN SCALES - GENERAL: PAINLEVEL_OUTOF10: 0 - NO PAIN

## 2024-03-11 ASSESSMENT — PAIN - FUNCTIONAL ASSESSMENT: PAIN_FUNCTIONAL_ASSESSMENT: 0-10

## 2024-03-11 NOTE — PROGRESS NOTES
"Speech-Language Pathology    Outpatient Speech-Language Pathology Treatment       Patient Name: Yaritza Hua \"Jennifer\"  MRN: [id number]  Today's Date: 3/11/2024     Time Calculation  Start Time: 1305  Stop Time: 1355  Time Calculation (min): 50 min    SLP Assessment:    She is unable to suck through a straw. SLP educated re: palatal ROM and insufficiency leading to this. She understands. Tolerated DPNS well, noting slight improved ROM during stimulation. No overt s/s of aspriation with applesauce today, will continue with use of NMES to improve strength prior to MBS.    Prognosis: Good  Treatment Tolerance: Patient tolerated treatment well  Strengths: Cognition, Motivation       Plan:  SLP TX Plan: Continue Plan of Care  SLP Plan: Skilled SLP  SLP Frequency: 2x daily until discharge  Duration: 30 days  Discussed POC: Patient  Discussed Risks/Benefits: Patient  Patient/Caregiver Agreeable: Yes      Subjective   In good spirit today.      General Visit Information:   Certification Period Start Date: 02/29/24  Certification Period End Date: 03/30/24  Number of Authorized Treatments : MN  Total Number of Visits :  (4/8)      Pain Assessment:   Pain Assessment: 0-10  Pain Score: 0 - No pain      Objective     GOALS:  Short term goals:   1. Patient will tolerate oral trials with SLP without pulmonary compromise.'  Progress: Continues with small amount of PO at home for pleasure. Had lemon pie that was too thick, tolerated lemon cream and chocolate cream as oral movement was easier due to texture.  Status: progressing    2. Patient will participate in sEMG to assist with effortful swallow strategy.n/a    3. Patient will participate with Neuromuscular Electrical Stimulation in conjunction with PO intake to improve pharyngeal strength as appropriate.  Progress: Electrodes placed over the belly of the digastric during swallow exercises and functional trials.  Work: Rest 57:3   185 us  Increased to 13.5 mA   21 min.   Trialed: " Completed сергей, hyolaryngeal elevation exercise and trialed applesauce. No overt s/s of aspiration.  Tolerated: well.  Status: progressing    4. Patient will participate with Deep Pharyngeal Neuromuscular electrical stimulation to improve oral strength and sensation.    Progress: completed 5 packets  Lingual groove  minimal due to anatomy changes  Palatal reflex - improved, noted  movements with light pressure.  Pharyngeal wall -minimal Rom today  Status: progressing    5. Patient will improve trismus to allow for impressions for palatal obturator, improved speech and swallow function. N/a    6. SLP to assist Dr. Adair with function of oral prosthesis related to speech/swallowing issues:   Progress: on hold at this time, focus is strengthening  Status: ongoing    7. Patient will complete home exercise program daily-   Progress: completing daily.  Status: progressing    Long term goals: To improve overall swallow function.      Outpatient Education:  Adult Outpatient Education  Individual(s) Educated: Patient  Verbal Education : reviewed home exercises  Risk and Benefits Discussed with Patient/Caregiver/Other: yes  Patient/Caregiver Demonstrated Understanding: yes  Plan of Care Discussed and Agreed Upon: yes  Patient Response to Education: Patient/Caregiver Verbalized Understanding of Information

## 2024-03-11 NOTE — PROGRESS NOTES
Provider Impressions     Large base of tongue lesion which was treated with a combination of chemotherapy and radiation. There is no evidence of any tumor recurrence.      Status post surgery and radiation therapy for a second primary on the contralateral oral tongue and oropharynx.  Her recent PET scan is negative.  I do not see any evidence of tumor recurrence.     Dysphagia.  She is PEG dependent.      Hypothyroidism for which she is on Synthroid.       I will see her in 3 months.      Chief Complaint     Follow-up status post surgery for the management of a left oral cavity and oropharyngeal cancer.      History of Present Illness    This lady was seen for the management of a squamous cell carcinoma of the base of tongue. She was diagnosed with a T4 N0 lesion of her base of tongue in February 2018. The tumor was P 16 positive. She was treated with a combination of chemotherapy and radiation and completed her treatments on April 25, 2018. She had a PET scan in March 2019 which was negative. She has had a TSH level in January 2024 which was normal. She is on Synthroid. She had some chest scanning in February 2023 which was negative. She was seen in January 2023 because of a concern about a left tongue lesion. On biopsy this turned out to be consistent with squamous cell carcinoma. She had surgery on March 2, 2023. This was followed by radiation therapy. She has had 2 swallow evaluation which she failed. She remains PEG dependent.      Physical Exam    Examination of the oral cavity shows a lot of changes from the prior surgeries.  I cannot appreciate any worrisome mucosal lesions.  The oropharyngeal inlet is now of good size. Palpation of the parotid, neck, thyroid feel fails to show any worrisome masses or adenopathies.  There is a lot of stiffness.     A flexible laryngoscopy was carried out. Under topical Xylocaine and Darío-Synephrine the scope was introduced through the nostril. The nasopharynx, base of tongue,  hypopharynx, and larynx are visualized. The vocal cords are normally mobile. There is significant pooling of secretions in the pharynx. The airway is adequate.  I cannot appreciate any mucosal lesions.

## 2024-03-12 ENCOUNTER — APPOINTMENT (OUTPATIENT)
Dept: SPEECH THERAPY | Facility: HOSPITAL | Age: 84
End: 2024-03-12
Payer: MEDICARE

## 2024-03-12 ENCOUNTER — OFFICE VISIT (OUTPATIENT)
Dept: OTOLARYNGOLOGY | Facility: CLINIC | Age: 84
End: 2024-03-12
Payer: MEDICARE

## 2024-03-12 VITALS — BODY MASS INDEX: 18.07 KG/M2 | HEIGHT: 63 IN | WEIGHT: 102 LBS

## 2024-03-12 DIAGNOSIS — C01 CANCER OF BASE OF TONGUE (MULTI): ICD-10-CM

## 2024-03-12 DIAGNOSIS — E03.9 ACQUIRED HYPOTHYROIDISM: Primary | ICD-10-CM

## 2024-03-12 DIAGNOSIS — R13.12 OROPHARYNGEAL DYSPHAGIA: ICD-10-CM

## 2024-03-12 PROCEDURE — 1036F TOBACCO NON-USER: CPT | Performed by: OTOLARYNGOLOGY

## 2024-03-12 PROCEDURE — 1126F AMNT PAIN NOTED NONE PRSNT: CPT | Performed by: OTOLARYNGOLOGY

## 2024-03-12 PROCEDURE — 31575 DIAGNOSTIC LARYNGOSCOPY: CPT | Performed by: OTOLARYNGOLOGY

## 2024-03-12 PROCEDURE — 99213 OFFICE O/P EST LOW 20 MIN: CPT | Performed by: OTOLARYNGOLOGY

## 2024-03-12 PROCEDURE — 1159F MED LIST DOCD IN RCRD: CPT | Performed by: OTOLARYNGOLOGY

## 2024-03-12 PROCEDURE — 1160F RVW MEDS BY RX/DR IN RCRD: CPT | Performed by: OTOLARYNGOLOGY

## 2024-03-12 PROCEDURE — 1157F ADVNC CARE PLAN IN RCRD: CPT | Performed by: OTOLARYNGOLOGY

## 2024-03-13 ENCOUNTER — TREATMENT (OUTPATIENT)
Dept: SPEECH THERAPY | Facility: HOSPITAL | Age: 84
End: 2024-03-13
Payer: MEDICARE

## 2024-03-13 DIAGNOSIS — R47.1 DYSARTHRIA: ICD-10-CM

## 2024-03-13 DIAGNOSIS — R13.10 DYSPHAGIA: ICD-10-CM

## 2024-03-13 DIAGNOSIS — R13.12 OROPHARYNGEAL DYSPHAGIA: Primary | ICD-10-CM

## 2024-03-13 PROCEDURE — 92526 ORAL FUNCTION THERAPY: CPT | Mod: GN

## 2024-03-13 ASSESSMENT — PAIN SCALES - GENERAL: PAINLEVEL_OUTOF10: 0 - NO PAIN

## 2024-03-13 ASSESSMENT — PAIN - FUNCTIONAL ASSESSMENT: PAIN_FUNCTIONAL_ASSESSMENT: 0-10

## 2024-03-13 NOTE — PROGRESS NOTES
"Speech-Language Pathology    Outpatient Speech-Language Pathology Treatment       Patient Name: Yaritza Hua \"Jennifer\"  MRN: [id number]  Today's Date: 3/13/2024     Time Calculation  Start Time: 1115  Stop Time: 1200  Time Calculation (min): 45 min    SLP Assessment:    She had a good appointment with Dr. Camacho. Endoscopy went well, no new findings. Aspiration risks were reviewed at her appointment and SLP reviewed them again today. She continues to complete oral care per [facility] protocol 4x daily. She understands aspiration risks and would like to continue with oral trials at home as she feels they will overall benefit her. She noted puree between palate and denture following therapy session on 3/11/24. She has an appointment with Dr. Adair to adjust prosthesis today.    Prognosis: Good  Treatment Tolerance: Patient tolerated treatment well  Strengths: Cognition, Motivation       Plan:  SLP TX Plan: Continue Plan of Care  SLP Plan: Skilled SLP  SLP Frequency: 2x per week  Duration: 30 days  Discussed POC: Patient  Discussed Risks/Benefits: Patient  Patient/Caregiver Agreeable: Yes      Subjective     In good spirits.    General Visit Information:   Certification Period Start Date: 02/29/24  Certification Period End Date: 03/30/24  Number of Authorized Treatments : MN  Total Number of Visits :  (5/8)      Pain Assessment:   Pain Assessment: 0-10  Pain Score: 0 - No pain      Objective     GOALS:  Short term goals:   1. Patient will tolerate oral trials with SLP without pulmonary compromise.'  Progress: Continues with small amount of PO at home for pleasure.   Status: progressing    2. Patient will participate in sEMG to assist with effortful swallow strategy.n/a    3. Patient will participate with Neuromuscular Electrical Stimulation in conjunction with PO intake to improve pharyngeal strength as appropriate.  Progress: Electrodes placed over the belly of the digastric during swallow exercises and functional " trials.  Work: Rest 57:3   190 us  14.5 mA   21 min.   Trialed: Completed сергей, hyolaryngeal elevation exercise and nectar thick coffee. No overt s/s of aspiration.  Tolerated: well.  Status: progressing    4. Patient will participate with Deep Pharyngeal Neuromuscular electrical stimulation to improve oral strength and sensation.    Progress: completed 5 packets  Lingual groove  minimal due to anatomy changes  Palatal reflex - slight ROM  Pharyngeal wall -minimal Rom today  Status: progressing    5. Patient will improve trismus to allow for impressions for palatal obturator, improved speech and swallow function. N/a    6. SLP to assist Dr. Adair with function of oral prosthesis related to speech/swallowing issues:   Progress: on hold at this time, focus is strengthening  Status: ongoing    7. Patient will complete home exercise program daily-   Progress: completing daily.  Status: progressing    Long term goals: To improve overall swallow function.      Outpatient Education:  Adult Outpatient Education  Individual(s) Educated: Patient  Verbal Education : reviewed aspriation risks and patient understands them, wishes to continue oral care and PO trials at home.  Risk and Benefits Discussed with Patient/Caregiver/Other: yes  Patient/Caregiver Demonstrated Understanding: yes  Plan of Care Discussed and Agreed Upon: yes  Patient Response to Education: Patient/Caregiver Verbalized Understanding of Information

## 2024-03-19 ENCOUNTER — TREATMENT (OUTPATIENT)
Dept: SPEECH THERAPY | Facility: HOSPITAL | Age: 84
End: 2024-03-19
Payer: MEDICARE

## 2024-03-19 DIAGNOSIS — R13.10 DYSPHAGIA: ICD-10-CM

## 2024-03-19 DIAGNOSIS — R47.1 DYSARTHRIA: ICD-10-CM

## 2024-03-19 DIAGNOSIS — R13.12 OROPHARYNGEAL DYSPHAGIA: Primary | ICD-10-CM

## 2024-03-19 PROCEDURE — 92507 TX SP LANG VOICE COMM INDIV: CPT | Mod: GN

## 2024-03-19 PROCEDURE — 92526 ORAL FUNCTION THERAPY: CPT | Mod: GN

## 2024-03-19 ASSESSMENT — PAIN - FUNCTIONAL ASSESSMENT: PAIN_FUNCTIONAL_ASSESSMENT: 0-10

## 2024-03-19 ASSESSMENT — PAIN SCALES - GENERAL: PAINLEVEL_OUTOF10: 0 - NO PAIN

## 2024-03-19 NOTE — PROGRESS NOTES
"Speech-Language Pathology    Outpatient Speech-Language Pathology Treatment       Patient Name: Yaritza Hua \"Jennifer\"  MRN: [id number]  Today's Date: 3/19/2024     Time Calculation  Start Time: 1120  Stop Time: 1205  Time Calculation (min): 45 min    SLP Assessment:  Patient stated \"the adjustment didn't work because there is food on the back and I can't get it off with my tongue\". Following further questioning and assessment, residue is noted on the palatal side of the prosthesis and she is unable to clear. Lingual clearance with the prosthesis adjustments recently made is adequate. Speech clarity has improved and overall intelligibility is 75% to listener unfamiliar with topic.    SLP spoke with Dr. Adair as patient has appointment following this appointment regarding: patient c/o   Pain on gumline near last molar  Oral residue lining prosthesis on palatal side  Biting on clasps    Prognosis: Good  Treatment Tolerance: Patient tolerated treatment well  Strengths: Cognition, Motivation       Plan:  SLP TX Plan: Continue Plan of Care  SLP Plan: Skilled SLP  SLP Frequency: 2x per week  Duration: 30 days  Discussed POC: Patient  Discussed Risks/Benefits: Patient  Patient/Caregiver Agreeable: Yes      Subjective     Pleasant today.    General Visit Information:   Certification Period Start Date: 02/29/24  Certification Period End Date: 03/30/24  Number of Authorized Treatments : MN  Total Number of Visits :  (6/8)      Pain Assessment:   Pain Assessment: 0-10  Pain Score: 0 - No pain      Objective     GOALS:  Short term goals:   1. Patient will tolerate oral trials with SLP without pulmonary compromise.'  Progress: Continues with small amount of PO at home for pleasure.   Status: progressing    2. Patient will participate in sEMG to assist with effortful swallow strategy.n/a    3. Patient will participate with Neuromuscular Electrical Stimulation in conjunction with PO intake to improve pharyngeal strength as " appropriate. N/a  Prior data:  Progress: Electrodes placed over the belly of the digastric during swallow exercises and functional trials.  Work: Rest 57:3   190 us  14.5 mA   21 min.   Trialed: Completed сергей, hyolaryngeal elevation exercise and nectar thick coffee. No overt s/s of aspiration.  Tolerated: well.  Status: progressing    4. Patient will participate with Deep Pharyngeal Neuromuscular electrical stimulation to improve oral strength and sensation.    Progress: completed 5 packets  Lingual groove  minimal due to anatomy changes  Palatal reflex - slight ROM  Pharyngeal wall -minimal Rom today  Status: progressing    5. Patient will improve trismus to allow for impressions for palatal obturator, improved speech and swallow function. N/a    6. SLP to assist Dr. Adair with function of oral prosthesis related to speech/swallowing issues:   Progress:   Speech: adequate speech today during reading of passage and in conversation following adjustments. No changes suggested with augmentation at this time.  Swallowing: able to clear lingual surface of the denture with pudding. Continue to note pharyngeal phase deficits of multiple swallows, redirection of bolus and throat clearing.   Status: ongoing    7. Patient will complete home exercise program daily-   Progress: completing daily.  Status: progressing    Long term goals: To improve overall swallow function.      Outpatient Education:  Adult Outpatient Education  Individual(s) Educated: Patient  Verbal Education : reviewed collaboration between Dr. Adair and SLP to adjust prosthesis  Diagnosis and Precautions: aspiration  Risk and Benefits Discussed with Patient/Caregiver/Other: yes  Patient/Caregiver Demonstrated Understanding: yes  Plan of Care Discussed and Agreed Upon: yes  Patient Response to Education: Patient/Caregiver Verbalized Understanding of Information

## 2024-03-21 ENCOUNTER — TREATMENT (OUTPATIENT)
Dept: SPEECH THERAPY | Facility: HOSPITAL | Age: 84
End: 2024-03-21
Payer: MEDICARE

## 2024-03-21 DIAGNOSIS — R13.12 OROPHARYNGEAL DYSPHAGIA: Primary | ICD-10-CM

## 2024-03-21 DIAGNOSIS — R47.1 DYSARTHRIA: ICD-10-CM

## 2024-03-21 DIAGNOSIS — R13.10 DYSPHAGIA: ICD-10-CM

## 2024-03-21 PROCEDURE — 92526 ORAL FUNCTION THERAPY: CPT | Mod: GN

## 2024-03-21 ASSESSMENT — PAIN - FUNCTIONAL ASSESSMENT: PAIN_FUNCTIONAL_ASSESSMENT: 0-10

## 2024-03-21 ASSESSMENT — PAIN SCALES - GENERAL: PAINLEVEL_OUTOF10: 0 - NO PAIN

## 2024-03-21 NOTE — PROGRESS NOTES
"Speech-Language Pathology    Outpatient Speech-Language Pathology Treatment       Patient Name: Yaritza Hua \"Jennifer\"  MRN: [id number]  Today's Date: 3/21/2024     Time Calculation  Start Time: 1120  Stop Time: 1200  Time Calculation (min): 40 min    SLP Assessment:  Patient had prosthesis adjustment made with Dr. Adair, however, she forgot her prosthesis at home. Complete oral care to clear thick secretions on pharyngeal wall, sensory stimulation and pharyngeal strengthening with NMES.    Prognosis: Good  Treatment Tolerance: Patient tolerated treatment well  Strengths: Cognition, Motivation       Plan:  SLP TX Plan: Continue Plan of Care  SLP Plan: Skilled SLP  SLP Frequency: 2x per week  Duration: 30 days  Discussed POC: Patient  Discussed Risks/Benefits: Patient  Patient/Caregiver Agreeable: Yes      Subjective     Rushed to appointment due to cat is ill.    General Visit Information:   Certification Period Start Date: 02/29/24  Certification Period End Date: 03/30/24  Number of Authorized Treatments : MN      Pain Assessment:   Pain Assessment: 0-10  Pain Score: 0 - No pain      Objective     GOALS:  Short term goals:   1. Patient will tolerate oral trials with SLP without pulmonary compromise.'  Progress: Continues with small amount of PO at home for pleasure.   Status: progressing    2. Patient will participate in sEMG to assist with effortful swallow strategy.n/a    3. Patient will participate with Neuromuscular Electrical Stimulation in conjunction with PO intake to improve pharyngeal strength as appropriate.     Progress: Electrodes placed over the belly of the digastric during swallow exercises and functional trials.  Work: Rest 57:3   195 us  13.5 mA   13 min.   Trialed: Completed сергей,  Tolerated: well.  Status: progressing    4. Patient will participate with Deep Pharyngeal Neuromuscular electrical stimulation to improve oral strength and sensation.    Progress: completed 6 packets  Lingual groove "  minimal due to anatomy changes  Palatal reflex - slight ROM  Pharyngeal wall -minimal Rom today  Status: progressing    5. Patient will improve trismus to allow for impressions for palatal obturator, improved speech and swallow function. N/a    6. SLP to assist Dr. Adair with function of oral prosthesis related to speech/swallowing issues: n/a no prosthesis today  Prior data  Progress:   Speech: adequate speech today during reading of passage and in conversation following adjustments. No changes suggested with augmentation at this time.  Swallowing: able to clear lingual surface of the denture with pudding. Continue to note pharyngeal phase deficits of multiple swallows, redirection of bolus and throat clearing.   Status: ongoing    7. Patient will complete home exercise program daily-   Progress: completing daily.  Status: progressing    Long term goals: To improve overall swallow function.      Outpatient Education:  Adult Outpatient Education  Individual(s) Educated: Patient  Verbal Education : reviewed plan due to no prosthesis today to include strengthening  Diagnosis and Precautions: aspiration  Risk and Benefits Discussed with Patient/Caregiver/Other: yes  Patient/Caregiver Demonstrated Understanding: yes  Plan of Care Discussed and Agreed Upon: yes  Patient Response to Education: Patient/Caregiver Verbalized Understanding of Information

## 2024-03-25 ENCOUNTER — TREATMENT (OUTPATIENT)
Dept: SPEECH THERAPY | Facility: HOSPITAL | Age: 84
End: 2024-03-25
Payer: MEDICARE

## 2024-03-25 DIAGNOSIS — R13.12 OROPHARYNGEAL DYSPHAGIA: Primary | ICD-10-CM

## 2024-03-25 DIAGNOSIS — R13.10 DYSPHAGIA: ICD-10-CM

## 2024-03-25 DIAGNOSIS — R47.1 DYSARTHRIA: ICD-10-CM

## 2024-03-25 PROCEDURE — 92526 ORAL FUNCTION THERAPY: CPT | Mod: GN

## 2024-03-25 ASSESSMENT — PAIN - FUNCTIONAL ASSESSMENT: PAIN_FUNCTIONAL_ASSESSMENT: 0-10

## 2024-03-25 ASSESSMENT — PAIN SCALES - GENERAL: PAINLEVEL_OUTOF10: 0 - NO PAIN

## 2024-03-26 NOTE — PROGRESS NOTES
"Speech-Language Pathology    Outpatient Speech-Language Pathology Treatment       Patient Name: Yaritza Hua \"Jennifer\"  MRN: [id number]  Today's Date: 3/25/2024     Time Calculation  Start Time: 1125  Stop Time: 1210  Time Calculation (min): 45 min    SLP Assessment:  Assessed functional changes with prosthesis in place. Suggested bolus placement posterior right due to consistent oral residue behind lower teeth. With posterior placement noted minimal oral residue with reswallow x1. Spoke with Dr. Adair, patient to practice placement at home, will reassess functional progress with prosthesis adjustments with repeat MBS when appropriate.    Prognosis: Good  Treatment Tolerance: Patient tolerated treatment well  Strengths: Cognition, Motivation       Plan:  SLP TX Plan: Continue Plan of Care  SLP Plan: Skilled SLP  SLP Frequency: 2x per week  Duration: 30 days  Discussed POC: Patient  Discussed Risks/Benefits: Patient  Patient/Caregiver Agreeable: Yes      Subjective     In good spirits today.    General Visit Information:   Certification Period Start Date: 02/29/24  Certification Period End Date: 03/30/24  Number of Authorized Treatments : MN  Total Number of Visits :  (8/8)      Pain Assessment:   Pain Assessment: 0-10  Pain Score: 0 - No pain      Objective     GOALS:  Short term goals:   1. Patient will tolerate oral trials with SLP without pulmonary compromise.'  Progress: Continues with small amount of PO at home for pleasure.   Status: progressing    2. Patient will participate in sEMG to assist with effortful swallow strategy.n/a    3. Patient will participate with Neuromuscular Electrical Stimulation in conjunction with PO intake to improve pharyngeal strength as appropriate. N/a    Progress: Electrodes placed over the belly of the digastric during swallow exercises and functional trials.  Work: Rest 57:3   195 us  13.5 mA   13 min.   Trialed: Completed сергей,  Tolerated: well.  Status: progressing    4. " Patient will participate with Deep Pharyngeal Neuromuscular electrical stimulation to improve oral strength and sensation. N/a    Progress: completed 6 packets  Lingual groove  minimal due to anatomy changes  Palatal reflex - slight ROM  Pharyngeal wall -minimal Rom today  Status: progressing    5. Patient will improve trismus to allow for impressions for palatal obturator, improved speech and swallow function. N/a    6. SLP to assist Dr. Adair with function of oral prosthesis related to speech/swallowing issues:     Progress:   Swallowing: Anterior oral residue with pudding, however, with posterior placement able to clear oral residue.   Status: ongoing    7. Patient will complete home exercise program daily-   Progress: completing daily.  Status: progressing    Long term goals: To improve overall swallow function.      Outpatient Education:  Adult Outpatient Education  Individual(s) Educated: Patient  Verbal Education : reviewed plan of strengthening and repeat MBS to assess prosthesis funciton  Diagnosis and Precautions: aspiration  Risk and Benefits Discussed with Patient/Caregiver/Other: yes  Patient/Caregiver Demonstrated Understanding: yes  Plan of Care Discussed and Agreed Upon: yes  Patient Response to Education: Patient/Caregiver Verbalized Understanding of Information

## 2024-03-28 ENCOUNTER — APPOINTMENT (OUTPATIENT)
Dept: SPEECH THERAPY | Facility: HOSPITAL | Age: 84
End: 2024-03-28
Payer: MEDICARE

## 2024-04-02 ENCOUNTER — TREATMENT (OUTPATIENT)
Dept: SPEECH THERAPY | Facility: HOSPITAL | Age: 84
End: 2024-04-02
Payer: MEDICARE

## 2024-04-02 DIAGNOSIS — R13.10 DYSPHAGIA: ICD-10-CM

## 2024-04-02 DIAGNOSIS — R13.12 OROPHARYNGEAL DYSPHAGIA: Primary | ICD-10-CM

## 2024-04-02 DIAGNOSIS — R47.1 DYSARTHRIA: ICD-10-CM

## 2024-04-02 PROCEDURE — 92526 ORAL FUNCTION THERAPY: CPT | Mod: GN

## 2024-04-02 ASSESSMENT — PAIN - FUNCTIONAL ASSESSMENT: PAIN_FUNCTIONAL_ASSESSMENT: 0-10

## 2024-04-02 ASSESSMENT — PAIN SCALES - GENERAL: PAINLEVEL_OUTOF10: 0 - NO PAIN

## 2024-04-02 NOTE — LETTER
April 3, 2024    Garry Adair DDS  [address] [facility]   [address] [place] [id number]    Patient: Jennifer Hua   YOB: 1940   Date of Visit: 4/2/2024       Dear Garry Adair DDS  [address] [facility]   [address] [place] [id number]    The attached plan of care is being sent to you because your patient’s medical reimbursement requires that you certify the plan of care. Your signature is required to allow uninterrupted insurance coverage.      You may indicate your approval by signing below and faxing this form back to us at No information on file..    Please call Dept: [phone] with any questions or concerns.    Thank you for this referral,        JANETTE Pino  [facility]  [address] [facility] [address]  [facility] [id number] [phone]    Payer: Payor: MEDICAL [facility] MEDICARE / Plan: [place] [facility] MEDICARE / Product Type: *No Product type* /                                                                         Date:     Dear JANETTE Pino,     Re: Ms. Yaritza Hua, MRN:[id number]    I certify that I have reviewed the attached plan of care and it is medically necessary for Ms. Yaritza Hua (1940) who is under my care.          ______________________________________                    _________________  Provider name and credentials                                           Date and time                                                                                           Plan of Care 4/2/24   Effective from: 4/2/2024  Effective to: 5/2/2024    Plan ID: [id number]            Participants as of Finalize on 4/3/2024    Name Type Comments Contact Info    Garry Adair DDS Referring Physician please review and sign        Last Plan Note     Author: JANETTE Pino Status: Sign when Signing Visit Last edited: 4/2/2024  2:00 PM       Speech-Language Pathology  30 Day Progress Note    Outpatient Speech-Language  "Pathology Treatment       Patient Name: Yaritza Hua \"Jennifer\"  MRN: [id number]  Today's Date: 4/2/2024     Time Calculation  Start Time: 1410  Stop Time: 1500  Time Calculation (min): 50 min    SLP Progress Update:  This patient has had palatal augmentations completed with improvement in her overall speech clarity. She reports her family and friends understand most of what she is saying. She has a desire to improve her swallow function to all for a small amount of PO for pleasure. Her palatal augmentation has allow her to clear her oral cavity and palate well.     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 She has a prior standing appointment with physical therapy to evaluate her range of motion and assist with reducing marked tension noted. She feels this is effecting her pharyngeal swallow function. We will focus on dysphagia therapy with goals noted below and target a repeat MBS within 4 weeks.      Trismus: improved from 27mm (1/16/24) to 32mm (4/2/24)  Prognosis: Good  Treatment Tolerance: Patient tolerated treatment well  Strengths: Cognition, Motivation       Plan:  SLP TX Plan: Goals Adjusted - see goals below  SLP Plan: Skilled SLP  SLP Frequency: 2x per week  Duration: 30 days  Discussed POC: Patient  Discussed Risks/Benefits: Patient  Patient/Caregiver Agreeable: Yes      Subjective     In good spirits today.    General Visit Information:   Certification Period Start Date: 02/29/24  Certification Period End Date: 03/30/24  Number of Authorized Treatments : MN  Total Number of Visits :  (1/8)      Pain Assessment:   Pain Assessment: 0-10  Pain Score: 0 - No pain      Objective     GOALS:  Short term goals:   Start date: 4/2/24  1. Patient will tolerate oral trials with SLP without pulmonary compromise.' Continue goal  Progress: Continues with small amount of PO at home for pleasure.   Status: progressing    2. Patient will participate in sEMG to assist with effortful " swallow strategy. GOAL MET at this time Discharge goal    3. Patient will participate with Neuromuscular Electrical Stimulation in conjunction with PO intake to improve pharyngeal strength as appropriate. Continue with goal    Progress: Electrodes placed over the belly of the digastric during swallow exercises and functional trials.  Work: Rest 57:3   200 us increase to 205 next visit  14.5 mA   20 min.   Trialed: Trialed applesauce  Tolerated: well.  Status: progressing    4. Patient will participate with Deep Pharyngeal Neuromuscular electrical stimulation to improve oral strength and sensation. Continue with goal    Progress: completed 5 packets  Lingual groove  minimal due to anatomy changes  Palatal reflex - slight ROM  Pharyngeal wall -minimal Rom today  Status: progressing    5. Patient will improve trismus to allow for impressions for palatal obturator, improved speech and swallow function: increased from 27mm to 32mm. Continue goal as average range goal is 35mm+    6. SLP to assist Dr. Adair with function of oral prosthesis related to speech/swallowing issues: Hold goal at this time, until repeat MBS completed.    Progress:   Swallowing: Anterior oral residue with pudding, however, with posterior placement able to clear oral residue.   Status: ongoing    7. Patient will complete home exercise program daily- continue with goal  Progress: completing daily.  Status: progressing    Long term goals: To improve overall swallow function.      Outpatient Education:  Adult Outpatient Education  Individual(s) Educated: Patient  Verbal Education : reviewed plan to complete several sessions of therapy and begin PT for fibrosis prior to repeat MBS. Continue with trismus ex  Diagnosis and Precautions: aspiration  Risk and Benefits Discussed with Patient/Caregiver/Other: yes  Patient/Caregiver Demonstrated Understanding: yes  Plan of Care Discussed and Agreed Upon: yes  Patient Response to Education: Patient/Caregiver  Verbalized Understanding of Information            Current Participants as of 4/3/2024    Name Type Comments Contact Info    Garry Adair DDS Referring Physician please review and sign     Signature pending

## 2024-04-02 NOTE — PROGRESS NOTES
"Speech-Language Pathology  30 Day Progress Note    Outpatient Speech-Language Pathology Treatment       Patient Name: Yaritza Hua \"Jennifer\"  MRN: [id number]  Today's Date: 4/2/2024     Time Calculation  Start Time: 1410  Stop Time: 1500  Time Calculation (min): 50 min    SLP Progress Update:  This patient has had palatal augmentations completed with improvement in her overall speech clarity. She reports her family and friends understand most of what she is saying. She has a desire to improve her swallow function to all for a small amount of PO for pleasure. Her palatal augmentation has allow her to clear her oral cavity and palate well.     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 She has a prior standing appointment with physical therapy to evaluate her range of motion and assist with reducing marked tension noted. She feels this is effecting her pharyngeal swallow function. We will focus on dysphagia therapy with goals noted below and target a repeat MBS within 4 weeks.      Trismus: improved from 27mm (1/16/24) to 32mm (4/2/24)  Prognosis: Good  Treatment Tolerance: Patient tolerated treatment well  Strengths: Cognition, Motivation       Plan:  SLP TX Plan: Goals Adjusted - see goals below  SLP Plan: Skilled SLP  SLP Frequency: 2x per week  Duration: 30 days  Discussed POC: Patient  Discussed Risks/Benefits: Patient  Patient/Caregiver Agreeable: Yes      Subjective     In good spirits today.    General Visit Information:   Certification Period Start Date: 02/29/24  Certification Period End Date: 03/30/24  Number of Authorized Treatments : MN  Total Number of Visits :  (1/8)      Pain Assessment:   Pain Assessment: 0-10  Pain Score: 0 - No pain      Objective     GOALS:  Short term goals:   Start date: 4/2/24  1. Patient will tolerate oral trials with SLP without pulmonary compromise.' Continue goal  Progress: Continues with small amount of PO at home for pleasure.   Status: " progressing    2. Patient will participate in sEMG to assist with effortful swallow strategy. GOAL MET at this time Discharge goal    3. Patient will participate with Neuromuscular Electrical Stimulation in conjunction with PO intake to improve pharyngeal strength as appropriate. Continue with goal    Progress: Electrodes placed over the belly of the digastric during swallow exercises and functional trials.  Work: Rest 57:3   200 us increase to 205 next visit  14.5 mA   20 min.   Trialed: Trialed applesauce  Tolerated: well.  Status: progressing    4. Patient will participate with Deep Pharyngeal Neuromuscular electrical stimulation to improve oral strength and sensation. Continue with goal    Progress: completed 5 packets  Lingual groove  minimal due to anatomy changes  Palatal reflex - slight ROM  Pharyngeal wall -minimal Rom today  Status: progressing    5. Patient will improve trismus to allow for impressions for palatal obturator, improved speech and swallow function: increased from 27mm to 32mm. Continue goal as average range goal is 35mm+    6. SLP to assist Dr. Adair with function of oral prosthesis related to speech/swallowing issues: Hold goal at this time, until repeat MBS completed.    Progress:   Swallowing: Anterior oral residue with pudding, however, with posterior placement able to clear oral residue.   Status: ongoing    7. Patient will complete home exercise program daily- continue with goal  Progress: completing daily.  Status: progressing    Long term goals: To improve overall swallow function.      Outpatient Education:  Adult Outpatient Education  Individual(s) Educated: Patient  Verbal Education : reviewed plan to complete several sessions of therapy and begin PT for fibrosis prior to repeat MBS. Continue with trismus ex  Diagnosis and Precautions: aspiration  Risk and Benefits Discussed with Patient/Caregiver/Other: yes  Patient/Caregiver Demonstrated Understanding: yes  Plan of Care  Discussed and Agreed Upon: yes  Patient Response to Education: Patient/Caregiver Verbalized Understanding of Information

## 2024-04-04 ENCOUNTER — TREATMENT (OUTPATIENT)
Dept: SPEECH THERAPY | Facility: HOSPITAL | Age: 84
End: 2024-04-04
Payer: MEDICARE

## 2024-04-04 DIAGNOSIS — R47.1 DYSARTHRIA: ICD-10-CM

## 2024-04-04 DIAGNOSIS — R13.12 OROPHARYNGEAL DYSPHAGIA: Primary | ICD-10-CM

## 2024-04-04 DIAGNOSIS — R13.10 DYSPHAGIA: ICD-10-CM

## 2024-04-04 PROCEDURE — 92526 ORAL FUNCTION THERAPY: CPT | Mod: GN

## 2024-04-04 ASSESSMENT — PAIN - FUNCTIONAL ASSESSMENT: PAIN_FUNCTIONAL_ASSESSMENT: 0-10

## 2024-04-04 ASSESSMENT — PAIN SCALES - GENERAL: PAINLEVEL_OUTOF10: 0 - NO PAIN

## 2024-04-04 NOTE — PROGRESS NOTES
"Speech-Language Pathology    SLP Adult Outpatient Speech-Language Pathology Treatment     Patient Name: Yaritza Hua \"Jennifer\"  MRN: [id number]  Today's Date: 4/4/2024     Time Calculation  Start Time: 1415  Stop Time: 1505  Time Calculation (min): 50 min    SLP Assessment:  Patient stated her \"tongue was tired\" after the last session. She is now comfortable trying small amounts of food (puree) at home again as prosthesis is comfortable. Reviewed ARK-J- despite 5mm improvement she had tool reversed during use. SLP re trained and added additional material to improve stretch.    SLP TX Intervention Outcome: Making Progress Towards Goals  Prognosis: Good  Treatment Tolerance: Patient tolerated treatment well  Strengths: Cognition, Motivation       Plan:  SLP TX Plan: Continue Plan of Care  SLP Plan: Skilled SLP  SLP Frequency: 2x per week  Duration: 30 days  Discussed POC: Patient  Discussed Risks/Benefits: Patient  Patient/Caregiver Agreeable: Yes      Subjective   Pleasant    General Visit Information:   Certification Period Start Date: 04/02/24  Certification Period End Date: 05/02/24  Number of Authorized Treatments : MN  Total Number of Visits :  (2/10)      Pain Assessment:   Pain Assessment: 0-10  Pain Score: 0 - No pain      Objective     Goals:  Start date: 4/2/24  1. Patient will tolerate oral trials with SLP without pulmonary compromise.  Progress: Continues with small amount of PO at home for pleasure.   Status: progressing      2. Patient will participate with Neuromuscular Electrical Stimulation in conjunction with PO intake to improve pharyngeal strength as appropriate.      Progress: Electrodes placed over the belly of the digastric during swallow exercises and functional trials.  Work: Rest 57:3   200 us increase to 205 next visit  14.5 mA   20 min.   Trialed: Trialed applesauce  Tolerated: well.  Status: progressing     3. Patient will participate with Deep Pharyngeal Neuromuscular electrical " stimulation to improve oral strength and sensation.      Progress: completed 5 packets  Lingual groove  minimal due to anatomy changes  Palatal reflex - slight ROM  Pharyngeal wall -minimal Rom today  Status: progressing     4. Patient will improve trismus to allow for impressions for palatal obturator, improved speech and swallow function (35mm+ average opening):   Progress: re educated on use of ARKJ and added material to increase stretch of masseter.  Status: progressing       5. Patient will complete home exercise program daily  Progress: completing daily.  Status: progressing    6. SLP to assist Dr. Adair with function of oral prosthesis related to speech/swallowing issues: Hold goal at this time, until repeat MBS completed.   Progress:   Swallowing: Anterior oral residue with pudding, however, with posterior placement able to clear oral residue.   Status: ongoing     Long term goals: To improve overall swallow function.    Outpatient Education:  Adult Outpatient Education  Individual(s) Educated: Patient  Verbal Education : reviewed effortful swallow with PO trials.  Diagnosis and Precautions: aspiration  Risk and Benefits Discussed with Patient/Caregiver/Other: yes  Patient/Caregiver Demonstrated Understanding: yes  Plan of Care Discussed and Agreed Upon: yes  Patient Response to Education: Patient/Caregiver Verbalized Understanding of Information

## 2024-04-04 NOTE — LETTER
April 4, 2024    Garry Adair DDS  [address]  [address] [place] [id number]    Patient: Jennifer Hua   YOB: 1940   Date of Visit: 4/4/2024       Dear Garry Adair DDS  [address] [facility]   [address] [place] [id number]    The attached plan of care is being sent to you because your patient’s medical reimbursement requires that you certify the plan of care. Your signature is required to allow uninterrupted insurance coverage.      You may indicate your approval by signing below and faxing this form back to us at No information on file..    Please call Dept: [phone] with any questions or concerns.    Thank you for this referral,        JANETTE Pino  [facility]  [address] [facility]  [facility] [id number] [phone]    Payer: Payor: MEDICAL [facility] MEDICARE / Plan: [employer] [facility] MEDICARE / Product Type: *No Product type* /                                                                         Date:     Dear JANETTE Pino,     Re: Ms. Yaritza Hua, MRN:[id number]    I certify that I have reviewed the attached plan of care and it is medically necessary for Ms. Yaritza Hua (1940) who is under my care.          ______________________________________                    _________________  Provider name and credentials                                           Date and time                                                                                           Plan of Care 4/2/24   Effective from: 4/2/2024  Effective to: 5/2/2024    Plan ID: [id number]            Participants as of Finalize on 4/4/2024    Name Type Comments Contact Info    Garry dAair DDS Referring Physician updated POC dates. Please review and sign.        Last Plan Note     Author: Kelly L Janezic, SLP Status: Incomplete Last edited: 4/4/2024  2:00 PM       Speech-Language Pathology  30 Day Progress Note    Outpatient  "Speech-Language Pathology Treatment       Patient Name: Yaritza Hua \"Jennifer\"  MRN: [id number]  Today's Date: 4/4/2024     Time Calculation  Start Time: 1415    SLP Progress Update:  This patient has had palatal augmentations completed with improvement in her overall speech clarity. She reports her family and friends understand most of what she is saying. She has a desire to improve her swallow function to all for a small amount of PO for pleasure. Her palatal augmentation has allow her to clear her oral cavity and palate well.     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 She has a prior standing appointment with physical therapy to evaluate her range of motion and assist with reducing marked tension noted. She feels this is effecting her pharyngeal swallow function. We will focus on dysphagia therapy with goals noted below and target a repeat MBS within 4 weeks.      Trismus: improved from 27mm (1/16/24) to 32mm (4/2/24)  Prognosis: Good  Treatment Tolerance: Patient tolerated treatment well  Strengths: Cognition, Motivation       Plan:  SLP TX Plan: Goals Adjusted - see goals below  SLP Plan: Skilled SLP  SLP Frequency: 2x per week  Duration: 30 days  Discussed POC: Patient  Discussed Risks/Benefits: Patient  Patient/Caregiver Agreeable: Yes      Subjective    In good spirits today.    General Visit Information:   Certification Period Start Date: 04/02/24  Certification Period End Date: 05/02/24  Number of Authorized Treatments : MN  Total Number of Visits :  (2/10)      Pain Assessment:   Pain Assessment: 0-10  Pain Score: 0 - No pain      Objective    GOALS:  Short term goals:   Start date: 4/2/24  1. Patient will tolerate oral trials with SLP without pulmonary compromise.' Continue goal  Progress: Continues with small amount of PO at home for pleasure.   Status: progressing    2. Patient will participate in sEMG to assist with effortful swallow strategy. GOAL MET at this time " Discharge goal    3. Patient will participate with Neuromuscular Electrical Stimulation in conjunction with PO intake to improve pharyngeal strength as appropriate. Continue with goal    Progress: Electrodes placed over the belly of the digastric during swallow exercises and functional trials.  Work: Rest 57:3   200 us increase to 205 next visit  14.5 mA   20 min.   Trialed: Trialed applesauce  Tolerated: well.  Status: progressing    4. Patient will participate with Deep Pharyngeal Neuromuscular electrical stimulation to improve oral strength and sensation. Continue with goal    Progress: completed 5 packets  Lingual groove  minimal due to anatomy changes  Palatal reflex - slight ROM  Pharyngeal wall -minimal Rom today  Status: progressing    5. Patient will improve trismus to allow for impressions for palatal obturator, improved speech and swallow function: increased from 27mm to 32mm. Continue goal as average range goal is 35mm+    6. SLP to assist Dr. Adair with function of oral prosthesis related to speech/swallowing issues: Hold goal at this time, until repeat MBS completed.    Progress:   Swallowing: Anterior oral residue with pudding, however, with posterior placement able to clear oral residue.   Status: ongoing    7. Patient will complete home exercise program daily- continue with goal  Progress: completing daily.  Status: progressing    Long term goals: To improve overall swallow function.      Outpatient Education:  Adult Outpatient Education  Individual(s) Educated: Patient  Verbal Education : reviewed effortful swallow with PO trials.  Diagnosis and Precautions: aspiration  Risk and Benefits Discussed with Patient/Caregiver/Other: yes  Patient/Caregiver Demonstrated Understanding: yes  Plan of Care Discussed and Agreed Upon: yes  Patient Response to Education: Patient/Caregiver Verbalized Understanding of Information                   Current Participants as of 4/4/2024    Name Type Comments Contact  Info    Garry Adair DDS Referring Physician updated POC dates. Please review and sign.     Signature pending

## 2024-04-04 NOTE — PROGRESS NOTES
"Speech-Language Pathology  30 Day Progress Note    Outpatient Speech-Language Pathology Treatment       Patient Name: Yaritza Hua \"Jennifer\"  MRN: [id number]  Today's Date: 4/4/2024     Time Calculation  Start Time: 1415    SLP Progress Update:  This patient has had palatal augmentations completed with improvement in her overall speech clarity. She reports her family and friends understand most of what she is saying. She has a desire to improve her swallow function to all for a small amount of PO for pleasure. Her palatal augmentation has allow her to clear her oral cavity and palate well.     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 She has a prior standing appointment with physical therapy to evaluate her range of motion and assist with reducing marked tension noted. She feels this is effecting her pharyngeal swallow function. We will focus on dysphagia therapy with goals noted below and target a repeat MBS within 4 weeks.      Trismus: improved from 27mm (1/16/24) to 32mm (4/2/24)  Prognosis: Good  Treatment Tolerance: Patient tolerated treatment well  Strengths: Cognition, Motivation       Plan:  SLP TX Plan: Goals Adjusted - see goals below  SLP Plan: Skilled SLP  SLP Frequency: 2x per week  Duration: 30 days  Discussed POC: Patient  Discussed Risks/Benefits: Patient  Patient/Caregiver Agreeable: Yes      Subjective     In good spirits today.    General Visit Information:   Certification Period Start Date: 04/02/24  Certification Period End Date: 05/02/24  Number of Authorized Treatments : MN  Total Number of Visits :  (2/10)      Pain Assessment:   Pain Assessment: 0-10  Pain Score: 0 - No pain      Objective     GOALS:  Short term goals:   Start date: 4/2/24  1. Patient will tolerate oral trials with SLP without pulmonary compromise.' Continue goal  Progress: Continues with small amount of PO at home for pleasure.   Status: progressing    2. Patient will participate in sEMG " to assist with effortful swallow strategy. GOAL MET at this time Discharge goal    3. Patient will participate with Neuromuscular Electrical Stimulation in conjunction with PO intake to improve pharyngeal strength as appropriate. Continue with goal    Progress: Electrodes placed over the belly of the digastric during swallow exercises and functional trials.  Work: Rest 57:3   200 us increase to 205 next visit  14.5 mA   20 min.   Trialed: Trialed applesauce  Tolerated: well.  Status: progressing    4. Patient will participate with Deep Pharyngeal Neuromuscular electrical stimulation to improve oral strength and sensation. Continue with goal    Progress: completed 5 packets  Lingual groove  minimal due to anatomy changes  Palatal reflex - slight ROM  Pharyngeal wall -minimal Rom today  Status: progressing    5. Patient will improve trismus to allow for impressions for palatal obturator, improved speech and swallow function: increased from 27mm to 32mm. Continue goal as average range goal is 35mm+    6. SLP to assist Dr. Adair with function of oral prosthesis related to speech/swallowing issues: Hold goal at this time, until repeat MBS completed.    Progress:   Swallowing: Anterior oral residue with pudding, however, with posterior placement able to clear oral residue.   Status: ongoing    7. Patient will complete home exercise program daily- continue with goal  Progress: completing daily.  Status: progressing    Long term goals: To improve overall swallow function.      Outpatient Education:  Adult Outpatient Education  Individual(s) Educated: Patient  Verbal Education : reviewed effortful swallow with PO trials.  Diagnosis and Precautions: aspiration  Risk and Benefits Discussed with Patient/Caregiver/Other: yes  Patient/Caregiver Demonstrated Understanding: yes  Plan of Care Discussed and Agreed Upon: yes  Patient Response to Education: Patient/Caregiver Verbalized Understanding of Information

## 2024-04-09 ENCOUNTER — TREATMENT (OUTPATIENT)
Dept: SPEECH THERAPY | Facility: HOSPITAL | Age: 84
End: 2024-04-09
Payer: MEDICARE

## 2024-04-09 DIAGNOSIS — R47.1 DYSARTHRIA: ICD-10-CM

## 2024-04-09 DIAGNOSIS — R13.10 DYSPHAGIA: ICD-10-CM

## 2024-04-09 DIAGNOSIS — R13.12 OROPHARYNGEAL DYSPHAGIA: Primary | ICD-10-CM

## 2024-04-09 PROCEDURE — 92526 ORAL FUNCTION THERAPY: CPT | Mod: GN

## 2024-04-09 ASSESSMENT — PAIN - FUNCTIONAL ASSESSMENT: PAIN_FUNCTIONAL_ASSESSMENT: 0-10

## 2024-04-09 ASSESSMENT — PAIN SCALES - GENERAL: PAINLEVEL_OUTOF10: 0 - NO PAIN

## 2024-04-09 NOTE — PROGRESS NOTES
"Speech-Language Pathology    SLP Adult Outpatient Speech-Language Pathology Treatment     Patient Name: Yaritza Hua \"Jennifer\"  MRN: [id number]  Today's Date: 4/9/2024  Time Calculation  Start Time: 1120  Stop Time: 1220  Time Calculation (min): 60 min    SLP Assessment:  Patient reported she was able to eat \"real cheesecake\" although it stuck a lot. Pt reports she is consistently eating once per day, every day. She feels tightness under her jaw and has scheduled appointments with PT next week to address this. Discussed plan for MBSS at beginning of May, pt in agreement.    Prognosis: Good  Treatment Tolerance: Patient tolerated treatment well  Strengths: Cognition, Motivation       Plan:  SLP Frequency: 2x per week  Duration: 30 days  Discussed POC: Patient  Discussed Risks/Benefits: Patient  Patient/Caregiver Agreeable: Yes      Subjective   Pleasant and participative    General Visit Information:   Certification Period Start Date: 04/02/24  Certification Period End Date: 05/02/24  Number of Authorized Treatments : MN  Total Number of Visits :  (3/10)      Pain Assessment:   Pain Assessment: 0-10  Pain Score: 0 - No pain      Objective     Goals:  Start date: 4/2/24  1. Patient will tolerate oral trials with SLP without pulmonary compromise.  Progress: Pt consistently eats once per day, every day.  Status: progressing      2. Patient will participate with Neuromuscular Electrical Stimulation in conjunction with PO intake to improve pharyngeal strength as appropriate.      Progress: Electrodes placed over the belly of the digastric during swallow exercises and functional trials.  Work: Rest 57:3   215 us   10.0 mA   20 min.   Trialed: Trialed applesauce  Tolerated: well.  Status: progressing     3. Patient will participate with Deep Pharyngeal Neuromuscular electrical stimulation to improve oral strength and sensation.      Progress: completed 5 packets  Lingual groove  minimal due to anatomy changes  Palatal " reflex - no ROM  Pharyngeal wall -minimal Rom today  Status: progressing     4. Patient will improve trismus to allow for impressions for palatal obturator, improved speech and swallow function (35mm+ average opening):   Progress: N/A  Prior Progress: re educated on use of ARKJ and added material to increase stretch of masseter.  Status: progressing    5. Patient will complete home exercise program daily  Progress: completing daily.  Status: progressing    6. SLP to assist Dr. Adair with function of oral prosthesis related to speech/swallowing issues: Hold goal at this time, until repeat MBS completed.   Progress: Hold  Swallowing: Anterior oral residue with pudding, however, with posterior placement able to clear oral residue.   Status: ongoing     Long term goals: To improve overall swallow function.    Outpatient Education:  Adult Outpatient Education  Individual(s) Educated: Patient  Verbal Education : reviewed effortful swallow with PO trials and introduced supraglottic swallow  Diagnosis and Precautions: aspiration  Risk and Benefits Discussed with Patient/Caregiver/Other: yes  Patient/Caregiver Demonstrated Understanding: yes  Plan of Care Discussed and Agreed Upon: yes  Patient Response to Education: Patient/Caregiver Verbalized Understanding of Information       Disclaimer: Session supervised by Kelly Calderón MA-CCC, SLP

## 2024-04-11 ENCOUNTER — TREATMENT (OUTPATIENT)
Dept: SPEECH THERAPY | Facility: HOSPITAL | Age: 84
End: 2024-04-11
Payer: MEDICARE

## 2024-04-11 DIAGNOSIS — R47.1 DYSARTHRIA: ICD-10-CM

## 2024-04-11 DIAGNOSIS — R13.10 DYSPHAGIA: ICD-10-CM

## 2024-04-11 DIAGNOSIS — R13.12 OROPHARYNGEAL DYSPHAGIA: Primary | ICD-10-CM

## 2024-04-11 PROCEDURE — 92526 ORAL FUNCTION THERAPY: CPT | Mod: GN

## 2024-04-11 ASSESSMENT — PAIN - FUNCTIONAL ASSESSMENT: PAIN_FUNCTIONAL_ASSESSMENT: 0-10

## 2024-04-11 ASSESSMENT — PAIN SCALES - GENERAL: PAINLEVEL_OUTOF10: 0 - NO PAIN

## 2024-04-11 NOTE — PROGRESS NOTES
"Speech-Language Pathology    Speech-Language Pathology    SLP Adult Outpatient Speech-Language Pathology Treatment     Patient Name: Yaritza Hua \"Jennifer\"  MRN: [id number]  Today's Date: 4/11/2024       SLP Assessment:  Patient reported she was able to eat \"real cheesecake\" although it stuck a lot. Pt reports she is consistently eating once per day, every day. She feels tightness under her jaw and has scheduled appointments with PT next week to address this. Discussed plan for MBSS at beginning of May, pt in agreement.            Plan:         Subjective   Pleasant and participative    General Visit Information:          Pain Assessment:          Objective     Goals:  Start date: 4/2/24  1. Patient will tolerate oral trials with SLP without pulmonary compromise.  Progress: Pt consistently eats once per day, every day.  Status: progressing      2. Patient will participate with Neuromuscular Electrical Stimulation in conjunction with PO intake to improve pharyngeal strength as appropriate.      Progress: Electrodes placed over the belly of the digastric during swallow exercises and functional trials.  Work: Rest 57:3   215 us   10.0 mA   20 min.   Trialed: Trialed applesauce  Tolerated: well.  Status: progressing     3. Patient will participate with Deep Pharyngeal Neuromuscular electrical stimulation to improve oral strength and sensation.      Progress: completed 5 packets  Lingual groove  minimal due to anatomy changes  Palatal reflex - no ROM  Pharyngeal wall -minimal Rom today  Status: progressing     4. Patient will improve trismus to allow for impressions for palatal obturator, improved speech and swallow function (35mm+ average opening):   Progress: reduced to 30mm prior 32mm  Prior Progress: re educated on use of ARKJ and added material to increase stretch of masseter.  Status: progressing    5. Patient will complete home exercise program daily  Progress: completing daily.  Status: progressing    6. SLP to " assist Dr. Adair with function of oral prosthesis related to speech/swallowing issues: Hold goal at this time, until repeat MBS completed.   Progress: Hold  Swallowing: Anterior oral residue with pudding, however, with posterior placement able to clear oral residue.   Status: ongoing     Long term goals: To improve overall swallow function.    Outpatient Education:          Disclaimer: Session supervised by STACEY Kang, SLP

## 2024-04-11 NOTE — PROGRESS NOTES
"Speech-Language Pathology    SLP Adult Outpatient Speech-Language Pathology Treatment     Patient Name: Yaritza Hua \"Jennifer\"  MRN: [id number]  Today's Date: 4/11/2024  Time Calculation  Start Time: 1120  Stop Time: 1220  Time Calculation (min): 60 min    SLP Assessment:  Patient reported she skipped her nasal spray today because she overslept, evident mucus coating her pharyngeal wall.     Prognosis: Good  Treatment Tolerance: Patient tolerated treatment well  Strengths: Motivation, Cognition       Plan:  SLP TX Plan: Continue Plan of Care  SLP Plan: Skilled SLP  SLP Frequency: 2x per week  Duration: 30 days  Discussed POC: Patient  Discussed Risks/Benefits: Patient  Patient/Caregiver Agreeable: Yes      Subjective   Pleasant and participative    General Visit Information:   Certification Period Start Date: 04/02/24  Certification Period End Date: 05/02/24  Number of Authorized Treatments : MN  Total Number of Visits :  (4/10)      Pain Assessment:   Pain Assessment: 0-10  Pain Score: 0 - No pain      Objective     Goals:  Start date: 4/2/24  1. Patient will tolerate oral trials with SLP without pulmonary compromise.  Progress: Pt consistently eats once per day, every day.  Status: progressing      2. Patient will participate with Neuromuscular Electrical Stimulation in conjunction with PO intake to improve pharyngeal strength as appropriate.      Progress: Electrodes placed over the belly of the digastric during swallow exercises and functional trials.  Work: Rest 57:3   210 us   10.5 mA   11 min.   Trialed: No food or drink per pt request, exercises only (effortful, modified сергей, supraglottic)  Tolerated: well.  Status: progressing     3. Patient will participate with Deep Pharyngeal Neuromuscular electrical stimulation to improve oral strength and sensation.      Progress: completed 5 packets  Lingual groove  minimal due to anatomy changes  Palatal reflex - no ROM  Pharyngeal wall -minimal Rom today, slight " umu at beginning of therapy  Status: progressing, pt had moderate coating of mucus today     4. Patient will improve trismus to allow for impressions for palatal obturator, improved speech and swallow function (35mm+ average opening):   Progress: Pt measuring at 30 mm, orofacial massage completed with re-education on home use for improvement.  Prior Progress: re educated on use of ARKJ and added material to increase stretch of masseter.  Status: progressing    5. Patient will complete home exercise program daily  Progress: completing daily.  Status: progressing    6. SLP to assist Dr. Adair with function of oral prosthesis related to speech/swallowing issues: Hold goal at this time, until repeat MBS completed.   Progress: Hold  Swallowing: Anterior oral residue with pudding, however, with posterior placement able to clear oral residue.   Status: ongoing     Long term goals: To improve overall swallow function.    Outpatient Education:  Adult Outpatient Education  Individual(s) Educated: Patient  Verbal Education : reviewed swallowing strategies and orofacial massage  Diagnosis and Precautions: aspiration  Risk and Benefits Discussed with Patient/Caregiver/Other: yes  Patient/Caregiver Demonstrated Understanding: yes  Plan of Care Discussed and Agreed Upon: yes  Patient Response to Education: Patient/Caregiver Verbalized Understanding of Information       Disclaimer: Session supervised by Kelly Calderón MA-CCC, SLP

## 2024-04-16 ENCOUNTER — TREATMENT (OUTPATIENT)
Dept: SPEECH THERAPY | Facility: HOSPITAL | Age: 84
End: 2024-04-16
Payer: MEDICARE

## 2024-04-16 DIAGNOSIS — R47.1 DYSARTHRIA: ICD-10-CM

## 2024-04-16 DIAGNOSIS — R13.12 OROPHARYNGEAL DYSPHAGIA: Primary | ICD-10-CM

## 2024-04-16 DIAGNOSIS — R13.10 DYSPHAGIA: ICD-10-CM

## 2024-04-16 PROCEDURE — 92526 ORAL FUNCTION THERAPY: CPT | Mod: GN

## 2024-04-16 ASSESSMENT — PAIN SCALES - GENERAL: PAINLEVEL_OUTOF10: 0 - NO PAIN

## 2024-04-16 ASSESSMENT — PAIN - FUNCTIONAL ASSESSMENT: PAIN_FUNCTIONAL_ASSESSMENT: 0-10

## 2024-04-16 NOTE — PROGRESS NOTES
"Speech-Language Pathology    SLP Adult Outpatient Speech-Language Pathology Treatment     Patient Name: Yaritza Hua \"Jennifer\"  MRN: [id number]  Today's Date: 4/16/2024       SLP Assessment:  Patient reported she skipped her nasal spray today because she overslept, evident mucus coating her pharyngeal wall.             Plan:         Subjective   Pleasant and participative    General Visit Information:          Pain Assessment:          Objective     Goals:  Start date: 4/2/24  1. Patient will tolerate oral trials with SLP without pulmonary compromise.  Progress: Pt consistently eats once per day, every day.  Status: progressing      2. Patient will participate with Neuromuscular Electrical Stimulation in conjunction with PO intake to improve pharyngeal strength as appropriate.      Progress: Electrodes placed over the belly of the digastric during swallow exercises and functional trials.  Work: Rest 57:3   210 us   10.5 mA   11 min.   Trialed: No food or drink per pt request, exercises only (effortful, modified сергей, supraglottic)  Tolerated: well.  Status: progressing     3. Patient will participate with Deep Pharyngeal Neuromuscular electrical stimulation to improve oral strength and sensation.      Progress: completed 5 packets  Lingual groove  minimal due to anatomy changes  Palatal reflex - no ROM  Pharyngeal wall -minimal Rom today, slight twinge at beginning of therapy  Status: progressing, pt had moderate coating of mucus today     4. Patient will improve trismus to allow for impressions for palatal obturator, improved speech and swallow function (35mm+ average opening):   Progress: Pt measuring at 30 mm, orofacial massage completed with re-education on home use for improvement.  Prior Progress: re educated on use of ARKJ and added material to increase stretch of masseter.  Status: progressing    5. Patient will complete home exercise program daily  Progress: completing daily.  Status: progressing    6. " SLP to assist Dr. Adair with function of oral prosthesis related to speech/swallowing issues: Hold goal at this time, until repeat MBS completed.   Progress: Hold  Swallowing: Anterior oral residue with pudding, however, with posterior placement able to clear oral residue.   Status: ongoing     Long term goals: To improve overall swallow function.    Outpatient Education:

## 2024-04-17 NOTE — PROGRESS NOTES
"Speech-Language Pathology      [facility] Adult Outpatient Speech-Language Pathology Treatment     Patient Name: Yaritza Hua \"Jennifer\"  MRN: [id number]  Today's Date: 4/16/24   Start Time: 1415  Stop Time: 1510  Time Calculation (min): 60 min    SLP Assessment:  She is eating a small amount of food at home. Working hard on trismus and is happy to note 2mm improvement this past week. Reviewed lingual palatal contact to assist with transfer of food. Will continue with review and sEMG review next session.    SLP TX Intervention Outcome: Making Progress Towards Goals  Prognosis: Good  Treatment Tolerance: Patient tolerated treatment well  Strengths: Motivation, Cognition       Plan:  SLP TX Plan: Continue Plan of Care  SLP Frequency: 2x per week  Duration: 30 days  Discussed POC: Patient  Discussed Risks/Benefits: Patient  Patient/Caregiver Agreeable: Yes      Subjective   Pleasant and participative    General Visit Information:   Certification Period Start Date: 04/02/24  Certification Period End Date: 05/02/24  Number of Authorized Treatments : MN  Total Number of Visits :  (5/10)      Pain Assessment:   Pain Assessment: 0-10  Pain Score: 0 - No pain      Objective     Goals:  Start date: 4/2/24  1. Patient will tolerate oral trials with SLP without pulmonary compromise.  Progress: Pt consistently eats once per day, every day.  Status: progressing      2. Patient will participate with Neuromuscular Electrical Stimulation in conjunction with PO intake to improve pharyngeal strength as appropriate.      Progress: Electrodes placed over the belly of the digastric during swallow exercises and functional trials.  Work: Rest 57:3   215 us   7.5 mA   11 min.   Trialed: Completed modified сергей and 3tps of pudding trials.  Tolerated: well. Oral leakage due to poor labial seal. Change in vocal quality noted, thick voice during PO trials, most likely due to pharyngeal residue  Status: progressing     3. Patient will participate with " Deep Pharyngeal Neuromuscular electrical stimulation to improve oral strength and sensation.      Progress: completed 4 packets  Lingual groove  minimal due to anatomy changes  Palatal reflex - no ROM  Pharyngeal wall -minimal Rom. Cleared moderate amount of thick sputum today.  Status: progressing, pt had moderate coating of mucus today     4. Patient will improve trismus to allow for impressions for palatal obturator, improved speech and swallow function (35mm+ average opening):   Progress: Pt increased to 32mm today, prior 30 mm  Status: progressing    5. Patient will complete home exercise program daily  Progress: completing daily.  Status: progressing    6. SLP to assist Dr. Adair with function of oral prosthesis related to speech/swallowing issues: Hold goal at this time, until repeat MBS completed.   Progress: Hold  Swallowing: Anterior oral residue with pudding, however, with posterior placement able to clear oral residue.   Status: ongoing     Long term goals: To improve overall swallow function.    Outpatient Education:  Adult Outpatient Education  Individual(s) Educated: Patient  Verbal Education : reviewed home exercise program  Diagnosis and Precautions: aspiration  Risk and Benefits Discussed with Patient/Caregiver/Other: yes  Patient/Caregiver Demonstrated Understanding: yes  Plan of Care Discussed and Agreed Upon: yes  Patient Response to Education: Patient/Caregiver Verbalized Understanding of Information

## 2024-04-18 ENCOUNTER — TREATMENT (OUTPATIENT)
Dept: SPEECH THERAPY | Facility: HOSPITAL | Age: 84
End: 2024-04-18
Payer: MEDICARE

## 2024-04-18 ENCOUNTER — EVALUATION (OUTPATIENT)
Dept: PHYSICAL THERAPY | Facility: CLINIC | Age: 84
End: 2024-04-18
Payer: MEDICARE

## 2024-04-18 DIAGNOSIS — R47.1 DYSARTHRIA: ICD-10-CM

## 2024-04-18 DIAGNOSIS — R13.10 DYSPHAGIA: ICD-10-CM

## 2024-04-18 DIAGNOSIS — R25.2 TRISMUS: ICD-10-CM

## 2024-04-18 DIAGNOSIS — R13.12 OROPHARYNGEAL DYSPHAGIA: Primary | ICD-10-CM

## 2024-04-18 PROCEDURE — 92526 ORAL FUNCTION THERAPY: CPT | Mod: GN

## 2024-04-18 PROCEDURE — 97162 PT EVAL MOD COMPLEX 30 MIN: CPT | Mod: GP | Performed by: PHYSICAL THERAPIST

## 2024-04-18 ASSESSMENT — PAIN - FUNCTIONAL ASSESSMENT
PAIN_FUNCTIONAL_ASSESSMENT: 0-10
PAIN_FUNCTIONAL_ASSESSMENT: 0-10

## 2024-04-18 ASSESSMENT — PAIN SCALES - GENERAL
PAINLEVEL_OUTOF10: 10 - WORST POSSIBLE PAIN
PAINLEVEL_OUTOF10: 0 - NO PAIN

## 2024-04-18 NOTE — PROGRESS NOTES
"Physical Therapy Evaluation and Treatment      Patient Name: Yaritza Hua \"Jennifer\"  MRN: [id number]  Today's Date: 4/18/2024  Time Calculation  Start Time: 1045  Stop Time: 1112  Time Calculation (min): 27 min  Moderate complexity due to patient's clinical presentation being evolving with changing characteristics, with comorbidities/complexities to include history of cancer, s/p subtotal glossectomy/pharyngectomy, all of which may negatively impact rehab tolerance and progression.    Insurance:  Visit number: 1 of 9  Authorization info: NO AUTH / [place] VISITS / $30 COPAY / OOP $3000   Insurance Type: Payor: [employer] [facility] MEDICARE / Plan: [email or web address] [facility] MEDICARE / Product Type: *No Product type* /     Current Problem:   1. Trismus  Referral to Physical Therapy    Follow Up In Physical Therapy          Subjective    General:  General  Reason for Referral: Trismus  Referred By: Dr. 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 She does not have pain in the back of her neck but the tightness has been getting worse in the lateral and anterior neck and into the jaw since about august. She does report pain in the both jaw R > L however she doesn't have much feeling on her L side. She does report that there are times where the pain is so bad where her teeth hurt but she does not get any headaches. She does not report any abnormal paresthesia other than from surgery. She is currently in remission. She has most of her pain in the morning and than following with talking and trying to eat. Intermittent R ear discomfort but it is very quick to resolve, she does not report any dizziness or tinnitus.  Precautions:  Precautions  Precautions Comment: History of cancer  Pain:  Pain Assessment  Pain Assessment: " 0-10  Pain Score: 10 - Worst possible pain (6-7 on average)  Prior Level of Function:  Prior Function Per Pt/Caregiver Report  Level of [place]: Independent with ADLs and functional transfers    Objective   General Assessments:  Range of Motion Comments: TMJ depression: 2.5; lateral deviation L 0.8, R 0.5    Palpation Comment: B massater; B scalenes; B SCM     Cervical Spine    Cervical AROM  Cervical flexion: (80°): 40  Cervical extension: (50°): 22  Cervical rotation right: (80°): 34  Cervical rotation left: (80°): 40      Outcome Measures:  Other Measures  TMD Disability Index: 19/40 - 48%     Treatments:  Manual Therapy  Manual Therapy Performed: Yes  Manual Therapy Activity 1: Dry Needling  IDN performed this date. Pt educated on risks and benefits of dry needling. Pt provided verbal consent. All universal precautions followed.    2 - 15 mm B massater    No adverse response. All IDN guidelines and safety protocols followed.      Assessment   Assessment:  PT Assessment Results: Decreased range of motion, Decreased strength, Pain  Rehab Prognosis: Good  Assessment Comment: Pt is a 84 y.o female presenting to therapy with TMJ and cervical deficits following Tongue cancer and surgery that occured in march of 2023. She has full ROM throughout cervical region however noted reduced quality of motion and reported increased discomfort during R cervical rotation. Pt demonstrated fair TMJ motion B however noted reduced lateral deviation toward R. These deficits with R cervical and TMJ motion further demonstrate functional restriction from musculature. At this time pt would benefit from skilled physical therapy in order to prevent further functional decline and help improved speech and swallowing.    Plan:  Treatment/Interventions: Dry needling, Education/ Instruction, Hot pack, Manual therapy, Neuromuscular re-education, Taping techniques, Therapeutic activities, Therapeutic exercises  PT Plan: Skilled PT  PT  Frequency: 1 time per week  Duration: 9 weeks; 8 sessions + eval  Onset Date: 04/18/24  Certification Period Start Date: 04/18/24  Certification Period End Date: 07/17/24  Number of Treatments Authorized: MN  Rehab Potential: Good    OP EDUCATION:  Outpatient Education  Individual(s) Educated: Patient  Education Provided: Anatomy, Home Exercise Program, POC, Other (Dry Needling)  Risk and Benefits Discussed with Patient/Caregiver/Other: yes  Patient/Caregiver Demonstrated Understanding: yes  Plan of Care Discussed and Agreed Upon: yes  Patient Response to Education: Patient/Caregiver Verbalized Understanding of Information, Patient/Caregiver Performed Return Demonstration of Exercises/Activities, Patient/Caregiver Asked Appropriate Questions    Goals:  Active       PT Problem       PT Goal 1       Start:  04/18/24    Expected End:  05/23/24       Pt will be 50% IND with HEP in 4 weeks in order to progress with therapy.          PT Goal 2       Start:  04/18/24    Expected End:  05/23/24       Pt will reduce pain levels to no more than 5/10 in 4 weeks in order to improve talking, eating/swallowing and sleeping.           PT Goal 3       Start:  04/18/24    Expected End:  05/23/24       Pt will demonstrate subjective improvement of ADLs and recreational activities through improved score of 13/40 on TMD in 4 weeks for daily care tasks and communication.          PT Goal 4       Start:  04/18/24    Expected End:  05/23/24       Pt will demonstrate equal cervical rotation in 4 weeks for improved cervical rotation during driving.            PT Problem       PT Goal 1       Start:  04/18/24    Expected End:  06/22/24       Pt will be 100% IND with HEP in 8 weeks in order to maintain progress with therapy.           PT Goal 2       Start:  04/18/24    Expected End:  06/22/24       Pt will reduce pain levels to no more than 2/10 in 8 weeks in order to improve talking, sleep and self care.          PT Goal 3       Start:   04/18/24    Expected End:  06/22/24       Pt will demonstrate subjective improvement of ADLs and recreational activities through improved score of 8/40 on TMD in 8 weeks for talking, social activities and sleeping

## 2024-04-19 NOTE — PROGRESS NOTES
"Speech-Language Pathology      SLP Adult Outpatient Speech-Language Pathology Treatment     Patient Name: Yaritza Hua \"Jennifer\"  MRN: [id number]  Today's Date: 4/18/24   Start Time: 1120  Stop Time: 1210  Time Calculation (min): 50 min    SLP Assessment:  Home exercises are consistently practiced and patient was able to maintain 32mm oral opening. She is comfortable eating small amount of puree or nectar thick liquids at home and understands all aspiration risks. She had a physical therapy evaluation today and will attend weekly visits to reduce tension in masseter and pterygoids in attempt to improve oral opening and overall swallow function.    SLP TX Intervention Outcome: Making Progress Towards Goals  Prognosis: Good  Treatment Tolerance: Patient tolerated treatment well  Strengths: Motivation, Cognition       Plan:  SLP TX Plan: Continue Plan of Care  SLP Frequency: 2x per week  Duration: 30 days  Discussed POC: Patient  Discussed Risks/Benefits: Patient  Patient/Caregiver Agreeable: Yes      Subjective   Pleasant and participative    General Visit Information:   Certification Period Start Date: 04/02/24  Certification Period End Date: 05/02/24  Number of Authorized Treatments : MN  Total Number of Visits :  (6/10)      Pain Assessment:   Pain Assessment: 0-10  Pain Score: 0 - No pain      Objective     Goals:  Start date: 4/2/24  1. Patient will tolerate oral trials with SLP without pulmonary compromise.  Progress: Pt consistently has small amount of puree or nectar thick liquids once per day..  Status: progressing      2. Patient will participate with Neuromuscular Electrical Stimulation in conjunction with PO intake to improve pharyngeal strength as appropriate. N/a    Prior data:   Progress: Electrodes placed over the belly of the digastric during swallow exercises and functional trials.  Work: Rest 57:3   215 us   7.5 mA   11 min.   Trialed: Completed modified сергей and 3tps of pudding " trials.  Tolerated: well. Oral leakage due to poor labial seal. Change in vocal quality noted, thick voice during PO trials, most likely due to pharyngeal residue  Status: progressing     3. Patient will participate with Deep Pharyngeal Neuromuscular electrical stimulation to improve oral strength and sensation.      Progress: completed 3 packets  Lingual groove  minimal due to anatomy changes, however, noted slight fasciculations in native tongue during stim  Palatal reflex - no ROM  Pharyngeal wall: 25%  ROM on right, unable to visualize left due to surgical flap.   Status: progressing     4. Patient will improve trismus to allow for impressions for palatal obturator, improved speech and swallow function (35mm+ average opening):   Progress: Stable at 32mm today, prior 30 mm  Status: progressing    5. Patient will complete home exercise program daily  Progress: completing daily.  Status: progressing    6. SLP to assist Dr. Adair with function of oral prosthesis related to speech/swallowing issues: Hold goal at this time, until repeat MBS completed.   Progress: Hold  Swallowing: Anterior oral residue with pudding, however, with posterior placement able to clear oral residue.   Status: ongoing     Long term goals: To improve overall swallow function.    Outpatient Education:  Adult Outpatient Education  Individual(s) Educated: Patient  Verbal Education : reviewed home exercise program  Diagnosis and Precautions: aspiration  Risk and Benefits Discussed with Patient/Caregiver/Other: yes  Patient/Caregiver Demonstrated Understanding: yes  Plan of Care Discussed and Agreed Upon: yes  Patient Response to Education: Patient/Caregiver Verbalized Understanding of Information

## 2024-04-23 ENCOUNTER — APPOINTMENT (OUTPATIENT)
Dept: SPEECH THERAPY | Facility: HOSPITAL | Age: 84
End: 2024-04-23
Payer: MEDICARE

## 2024-04-23 ENCOUNTER — OFFICE VISIT (OUTPATIENT)
Dept: DERMATOLOGY | Facility: CLINIC | Age: 84
End: 2024-04-23
Payer: MEDICARE

## 2024-04-23 DIAGNOSIS — L81.4 LENTIGO: ICD-10-CM

## 2024-04-23 DIAGNOSIS — L57.0 ACTINIC KERATOSIS: ICD-10-CM

## 2024-04-23 DIAGNOSIS — Z12.83 SKIN CANCER SCREENING: Primary | ICD-10-CM

## 2024-04-23 DIAGNOSIS — L82.1 SEBORRHEIC KERATOSIS: ICD-10-CM

## 2024-04-23 DIAGNOSIS — D22.9 NEVUS: ICD-10-CM

## 2024-04-23 PROCEDURE — 99213 OFFICE O/P EST LOW 20 MIN: CPT | Performed by: NURSE PRACTITIONER

## 2024-04-23 PROCEDURE — 1160F RVW MEDS BY RX/DR IN RCRD: CPT | Performed by: NURSE PRACTITIONER

## 2024-04-23 PROCEDURE — 1036F TOBACCO NON-USER: CPT | Performed by: NURSE PRACTITIONER

## 2024-04-23 PROCEDURE — 1157F ADVNC CARE PLAN IN RCRD: CPT | Performed by: NURSE PRACTITIONER

## 2024-04-23 PROCEDURE — 1159F MED LIST DOCD IN RCRD: CPT | Performed by: NURSE PRACTITIONER

## 2024-04-23 NOTE — PROGRESS NOTES
Subjective     Jennifer Hua is a 84 y.o. female who presents for the following: Skin Check.   Established patient in for 6 month full body skin exam.       Review of Systems:  No other skin or systemic complaints other than what is documented elsewhere in the note.    The following portions of the chart were reviewed this encounter and updated as appropriate:       Skin Cancer History  Hx of SCC- left upper shoulder     Specialty Problems          Dermatology Problems    Actinic keratosis    Inflamed seborrheic keratosis    Neoplasm of uncertain behavior of skin    Abnormal granulation tissue of abdomen     Past Medical History:  Jennifer Hua  has a past medical history of Personal history of malignant neoplasm, unspecified, Personal history of other diseases of the circulatory system, Personal history of other diseases of the nervous system and sense organs, Personal history of other endocrine, nutritional and metabolic disease, and Personal history of other endocrine, nutritional and metabolic disease.    Past Surgical History:  Jennifer Hua  has a past surgical history that includes Rotator cuff repair (2018); Appendectomy (2018);  section, classic (2018); Hernia repair (2018); Tonsillectomy (2018); Foot surgery (2018); Back surgery (2018); and Hand surgery (2018).    Family History:  Patient family history includes Allergies in an other family member.    Social History:  Jennifer Hua  reports that she quit smoking about 40 years ago. Her smoking use included cigarettes. She has never used smokeless tobacco. No history on file for alcohol use and drug use.    Allergies:  Carbamazepine, Shellfish containing products, and Simvastatin    Current Medications / CAM's:    Current Outpatient Medications:     atorvastatin (Lipitor) 40 mg tablet, Take by mouth., Disp: , Rfl:     Betasept Surgical Scrub 4 % external liquid, USE AS DIRECTED., Disp: , Rfl:      chlorhexidine (Peridex) 0.12 % solution, Use in the mouth or throat 4 times a day., Disp: , Rfl:     enoxaparin (Lovenox) 40 mg/0.4 mL syringe, , Disp: , Rfl:     enoxaparin (Lovenox) 60 mg/0.6 mL syringe, Inject 0.5 mL (50 mg) under the skin every 12 hours., Disp: , Rfl:     latanoprost (Xalatan) 0.005 % ophthalmic solution, Administer into affected eye(s)., Disp: , Rfl:     levothyroxine (Synthroid, Levoxyl) 50 mcg tablet, Take 1 tablet (50 mcg) by mouth once daily in the morning. Take before meals. Please take on an empty stomach with water only and wait 1 hour before taking food or other medications., Disp: 90 tablet, Rfl: 3    nystatin (Mycostatin) 100,000 unit/mL suspension, Take 2 mL (200,000 Units) by mouth 4 times a day., Disp: , Rfl:     oxyCODONE (Roxicodone) 5 mg immediate release tablet, Take 1 tablet (5 mg) by mouth every 4 hours if needed., Disp: , Rfl:     oxyCODONE (Roxicodone) 5 mg/5 mL solution, , Disp: , Rfl:     PreviDent 1.1 % gel, USE ONE TIME PER DAY IN FLUORIDE CARRIERS  FOR 10 MINUTES, Disp: , Rfl:     sodium chloride 0.9 % irrigation solution, FOR USE WITH TRACHEOSTOMY CARE AND SUCTIONING THREE TIMES A DAY AND AS NEEDED, Disp: , Rfl:     warfarin (Coumadin) 5 mg tablet, Take by mouth., Disp: , Rfl:      Objective   Well appearing patient in no apparent distress; mood and affect are within normal limits.    A full examination was performed including scalp, head, eyes, ears, nose, lips, neck, chest, axillae, abdomen, back, buttocks, bilateral upper extremities, bilateral lower extremities, hands, feet, fingers, toes, fingernails, and toenails. All findings within normal limits unless otherwise noted below.    Assessment/Plan   1. Skin cancer screening    The patient presented for a routine skin examination today. There are no specific concerns regarding skin health and no new or changing moles, lesions, or rashes.     Assessment: Based on the comprehensive skin examination, there were no  concerning or abnormal findings. The patient's skin appeared to be in good health, without any notable dermatologic conditions or lesions.    Plan: Given the absence of any significant skin findings, no specific interventions or treatments are warranted at this time. The patient was educated on the importance of regular skin self-examinations and advised to promptly report any changes or concerns. Routine follow-up for a skin examination was recommended.    -These lesions have benign, reassuring patterns on dermoscopy.  -There were no concerning features found on exam today.  -Recommend continued self-observation, and to contact the office if any changes in nevi are  noticed.    Discussed/information given on safe sun practices and use of sunscreen, sun protective clothing or sun avoidance. Recommend to use OTC medication of sunscreen SPF 30 or higher on a daily basis prior to sun exposure to reduce the risk of skin cancer.    Contact Office if: Any lesions change in size, shape or color; itch, bum or bleed.         2. Nevus  Multiple benign appearing flesh colored to pigmented macules and papules     Plan: Counseling.  I counseled the patient regarding the following:  Instructions: Monthly self-skin checks to monitor for any changes in moles are recommended. Expectations: Benign Nevi are pigmented nests of cells within the skin.No treatment is necessary. Contact Office if: Any moles change in size, shape or color; itch, bum or bleed.    3. Lentigo  Scattered tan macules in sun-exposed areas.    Solar lentigo (a type of lentigo also known as a senile lentigo, age spot, or liver spot) is a benign pigmented macule appearing on fair-skinned individuals that is related to ultraviolet radiation (UVR) exposure, typically from the sun.     PLAN:  Limiting sun exposure through avoidance, protective clothing, and use of sunscreens can help prevent the appearance of solar lentigines.    If lesion changes or becomes  "symptomatic she should return to clinic    4. Seborrheic keratosis    Seborrheic keratoses (SKs) are extremely common benign neoplasms of the skin. There can be few or hundreds of these raised, \"stuck-on\"-appearing papules and plaques with well-defined borders. The cause is unknown, although there is a familial trait for the development of multiple SKs.      SKs tend to increase in incidence and number with increasing age.     Skin Care: Seborrheic Keratoses are benign. No treatment is necessary.    Patient was instructed to call the office if any lesions become irritated or inflamed        5. Actinic keratosis (2)  Left Supraclavicular Area, Right Forehead  Erythematous macules with gritty scale.    Destr of lesion - Left Supraclavicular Area, Right Forehead  Complexity: simple    Destruction method: cryotherapy    Informed consent: discussed and consent obtained    Lesion destroyed using liquid nitrogen: Yes    Cryotherapy cycles:  1  Outcome: patient tolerated procedure well with no complications    Post-procedure details: wound care instructions given             "

## 2024-04-24 ENCOUNTER — APPOINTMENT (OUTPATIENT)
Dept: SPEECH THERAPY | Facility: HOSPITAL | Age: 84
End: 2024-04-24
Payer: MEDICARE

## 2024-04-25 ENCOUNTER — TREATMENT (OUTPATIENT)
Dept: PHYSICAL THERAPY | Facility: CLINIC | Age: 84
End: 2024-04-25
Payer: MEDICARE

## 2024-04-25 ENCOUNTER — TREATMENT (OUTPATIENT)
Dept: SPEECH THERAPY | Facility: HOSPITAL | Age: 84
End: 2024-04-25
Payer: MEDICARE

## 2024-04-25 DIAGNOSIS — R13.10 DYSPHAGIA: ICD-10-CM

## 2024-04-25 DIAGNOSIS — R25.2 TRISMUS: ICD-10-CM

## 2024-04-25 DIAGNOSIS — R47.1 DYSARTHRIA: ICD-10-CM

## 2024-04-25 PROCEDURE — 92526 ORAL FUNCTION THERAPY: CPT | Mod: GN

## 2024-04-25 PROCEDURE — 97140 MANUAL THERAPY 1/> REGIONS: CPT | Mod: GP | Performed by: PHYSICAL THERAPIST

## 2024-04-25 ASSESSMENT — PAIN SCALES - GENERAL
PAINLEVEL_OUTOF10: 0 - NO PAIN
PAINLEVEL_OUTOF10: 4

## 2024-04-25 ASSESSMENT — PAIN - FUNCTIONAL ASSESSMENT
PAIN_FUNCTIONAL_ASSESSMENT: 0-10
PAIN_FUNCTIONAL_ASSESSMENT: 0-10

## 2024-04-25 NOTE — PROGRESS NOTES
Physical Therapy Treatment    Patient Name: Yaritza Hua  MRN: [id number]  Today's Date: 4/25/2024  Time Calculation  Start Time: 1330  Stop Time: 1350  Time Calculation (min): 20 min  PT Therapeutic Procedures Time Entry  Manual Therapy Time Entry: 20    Insurance:  Visit number: 2 of 9  Authorization info: NO AUTH / [place] / [place]30 COPAY   Insurance Type: Payor: MEDICAL [facility] MEDICARE / Plan: [employer] [facility] MEDICARE / Product Type: *No Product type* /     Current Problem   1. Trismus  Follow Up In Physical Therapy          Subjective   General   Reason for Referral: Trismus  Referred By: Dr. 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  Precautions:  Precautions  Precautions Comment: History of cancer  Pain   Pain Assessment: 0-10  Pain Score: 4  Post Treatment Pain Level 4    Objective   Edema noted along chin region B     Treatments:    Manual:  Manual Therapy  Manual Therapy Performed: Yes  Manual Therapy Activity 1: Dry needling  IDN performed this date. Pt educated on risks and benefits of dry needling. Pt provided verbal consent. All universal precautions followed.    2 - 30 mm R massater  1 - 30 mm medial pterygoid B   1 - 30 mm suprahyoid B     No adverse response. All IDN guidelines and safety protocols followed.      Assessment   Assessment:   PT Assessment  Assessment Comment: Pt tolerated session well shorter this date due to scheduling will progress with manual therapy next session.    Plan:    Manual therapy along chin region for edema     OP EDUCATION:   Will provide stretching activities next session     Goals:   Active       PT Problem       PT Goal 1       Start:  04/18/24    Expected End:  05/23/24       Pt will be 50% IND with HEP in 4 weeks in  order to progress with therapy.          PT Goal 2       Start:  04/18/24    Expected End:  05/23/24       Pt will reduce pain levels to no more than 5/10 in 4 weeks in order to improve talking, eating/swallowing and sleeping.           PT Goal 3       Start:  04/18/24    Expected End:  05/23/24       Pt will demonstrate subjective improvement of ADLs and recreational activities through improved score of 13/40 on TMD in 4 weeks for daily care tasks and communication.          PT Goal 4       Start:  04/18/24    Expected End:  05/23/24       Pt will demonstrate equal cervical rotation in 4 weeks for improved cervical rotation during driving.            PT Problem       PT Goal 1       Start:  04/18/24    Expected End:  06/22/24       Pt will be 100% IND with HEP in 8 weeks in order to maintain progress with therapy.           PT Goal 2       Start:  04/18/24    Expected End:  06/22/24       Pt will reduce pain levels to no more than 2/10 in 8 weeks in order to improve talking, sleep and self care.          PT Goal 3       Start:  04/18/24    Expected End:  06/22/24       Pt will demonstrate subjective improvement of ADLs and recreational activities through improved score of 8/40 on TMD in 8 weeks for talking, social activities and sleeping

## 2024-04-25 NOTE — PROGRESS NOTES
"    Speech-Language Pathology      SLP Adult Outpatient Speech-Language Pathology Treatment     Patient Name: Yaritza Hua \"Jennifer\"  MRN: [id number]  Today's Date: 4/18/24  Time Calculation  Start Time: 1415  Stop Time: 1500  Time Calculation (min): 45 min      SLP Assessment:  Patient stated she had a busy week and was fatigued. She feels her throat is swollen, her voice is hoarse and her throat hurts to swallow. Upon palpation SLP ID lingual irritation on native tongue, right back near bulb. SLP contacted Dr. Adair  who will see patient 4/29/24 per her request. He advised not wearing prosthesis for 2 days in attempt to heal. Patient agrees and understands all instructions. Patient agreed to defer further tx today.      SLP TX Intervention Outcome: Making Progress Towards Goals  Prognosis: Good  Treatment Tolerance: Patient tolerated treatment well  Strengths: Cognition, Motivation       Plan:  SLP TX Plan: Continue Plan of Care  SLP Plan: Skilled SLP  SLP Frequency: 2x per week  Duration: 30 days  Discussed POC: Patient  Discussed Risks/Benefits: Patient  Patient/Caregiver Agreeable: Yes      Subjective   Pleasant and participative    General Visit Information:   Certification Period Start Date: 04/02/24  Certification Period End Date: 05/02/24  Number of Authorized Treatments : MN  Total Number of Visits :  (7/10)      Pain Assessment:   Pain Assessment: 0-10  Pain Score: 0 - No pain      Objective     Goals:  Start date: 4/2/24  1. Patient will tolerate oral trials with SLP without pulmonary compromise. N/a today due to lingual irritation and patient fatigue  Progress: Pt consistently has small amount of puree or nectar thick liquids once per day..  Status: progressing      2. Patient will participate with Neuromuscular Electrical Stimulation in conjunction with PO intake to improve pharyngeal strength as appropriate. N/a today due to lingual irritation and patient fatigue    Prior data: "   Progress: Electrodes placed over the belly of the digastric during swallow exercises and functional trials.  Work: Rest 57:3   215 us   7.5 mA   11 min.   Trialed: Completed modified сергей and 3tps of pudding trials.  Tolerated: well. Oral leakage due to poor labial seal. Change in vocal quality noted, thick voice during PO trials, most likely due to pharyngeal residue  Status: progressing     3. Patient will participate with Deep Pharyngeal Neuromuscular electrical stimulation to improve oral strength and sensation. N/a today due to lingual irritation and patient fatigue     Progress: completed 3 packets  Lingual groove  minimal due to anatomy changes, however, noted slight fasciculations in native tongue during stim  Palatal reflex - no ROM  Pharyngeal wall: 25%  ROM on right, unable to visualize left due to surgical flap.   Status: progressing     4. Patient will improve trismus to allow for impressions for palatal obturator, improved speech and swallow function (35mm+ average opening): N/a today due to lingual irritation and patient fatigue  Progress: Stable at 32mm today, prior 30 mm  Status: progressing    5. Patient will complete home exercise program daily  Progress: completing daily.  Status: progressing    6. SLP to assist Dr. Ariza with function of oral prosthesis related to speech/swallowing issues: Hold goal at this time, until repeat MBS completed.   Progress: Hold  Swallowing: Anterior oral residue with pudding, however, with posterior placement able to clear oral residue.   Status: ongoing     Long term goals: To improve overall swallow function.    Outpatient Education:  Adult Outpatient Education  Individual(s) Educated: Patient  Verbal Education : spoke with patient and Dr Ariza re: lingual irritation. Pt to see Dr ariza 4/29/24.  Diagnosis and Precautions: aspiration  Risk and Benefits Discussed with Patient/Caregiver/Other: yes  Patient/Caregiver Demonstrated Understanding: yes  Plan of  Care Discussed and Agreed Upon: yes  Patient Response to Education: Patient/Caregiver Verbalized Understanding of Information

## 2024-04-29 ENCOUNTER — TREATMENT (OUTPATIENT)
Dept: PHYSICAL THERAPY | Facility: CLINIC | Age: 84
End: 2024-04-29
Payer: MEDICARE

## 2024-04-29 ENCOUNTER — TREATMENT (OUTPATIENT)
Dept: SPEECH THERAPY | Facility: HOSPITAL | Age: 84
End: 2024-04-29
Payer: MEDICARE

## 2024-04-29 DIAGNOSIS — R47.1 DYSARTHRIA: ICD-10-CM

## 2024-04-29 DIAGNOSIS — R25.2 TRISMUS: ICD-10-CM

## 2024-04-29 DIAGNOSIS — R13.10 DYSPHAGIA: ICD-10-CM

## 2024-04-29 DIAGNOSIS — R13.12 OROPHARYNGEAL DYSPHAGIA: Primary | ICD-10-CM

## 2024-04-29 PROCEDURE — 97140 MANUAL THERAPY 1/> REGIONS: CPT | Mod: GP | Performed by: PHYSICAL THERAPIST

## 2024-04-29 PROCEDURE — 92526 ORAL FUNCTION THERAPY: CPT | Mod: GN

## 2024-04-29 ASSESSMENT — PAIN SCALES - GENERAL: PAINLEVEL_OUTOF10: 0 - NO PAIN

## 2024-04-29 ASSESSMENT — PAIN - FUNCTIONAL ASSESSMENT: PAIN_FUNCTIONAL_ASSESSMENT: 0-10

## 2024-04-29 NOTE — PROGRESS NOTES
Physical Therapy Treatment    Patient Name: Yaritza Hua  MRN: [id number]  Today's Date: 4/29/2024  Time Calculation  Start Time: 1315  Stop Time: 1349  Time Calculation (min): 34 min  PT Therapeutic Procedures Time Entry  Manual Therapy Time Entry: 28    Insurance:  Visit number: 3 of 9  Authorization info: NO AUTH / [place] / [place]30 COPAY   Insurance Type: Payor: MEDICAL [facility] MEDICARE / Plan: [employer] [facility] MEDICARE / Product Type: *No Product type* /     Current Problem   1. Trismus  Follow Up In Physical Therapy          Subjective   General   Reason for Referral: Trismus  Referred By: Dr. 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  Precautions:  Precautions  Precautions Comment: History of cancer  Pain    2  Post Treatment Pain Level 0    Objective   Soft tissue hypertonicity along anterior cervical /jaw    Treatments:    Manual:  Manual Therapy  Manual Therapy Performed: Yes  Manual Therapy Activity 1: STM along B subocippitals, SCM, scalenes, hyoid (variable pressure including light to help with edema)      Assessment   Assessment:   PT Assessment  Assessment Comment: Pt tolerated session well excessive tension throughout anterior cervical and jaw structures, utilized manual therapy this date to help reduce pain and improve soft tissue quality. Will continue as tolerated.    Plan:    Continue with manual therapy     OP EDUCATION:   Use of heat post     Goals:   Active       PT Problem       PT Goal 1       Start:  04/18/24    Expected End:  05/23/24       Pt will be 50% IND with HEP in 4 weeks in order to progress with therapy.          PT Goal 2       Start:  04/18/24    Expected End:  05/23/24       Pt will reduce pain levels to no more than 5/10 in 4 weeks in  order to improve talking, eating/swallowing and sleeping.           PT Goal 3       Start:  04/18/24    Expected End:  05/23/24       Pt will demonstrate subjective improvement of ADLs and recreational activities through improved score of 13/40 on TMD in 4 weeks for daily care tasks and communication.          PT Goal 4       Start:  04/18/24    Expected End:  05/23/24       Pt will demonstrate equal cervical rotation in 4 weeks for improved cervical rotation during driving.            PT Problem       PT Goal 1       Start:  04/18/24    Expected End:  06/22/24       Pt will be 100% IND with HEP in 8 weeks in order to maintain progress with therapy.           PT Goal 2       Start:  04/18/24    Expected End:  06/22/24       Pt will reduce pain levels to no more than 2/10 in 8 weeks in order to improve talking, sleep and self care.          PT Goal 3       Start:  04/18/24    Expected End:  06/22/24       Pt will demonstrate subjective improvement of ADLs and recreational activities through improved score of 8/40 on TMD in 8 weeks for talking, social activities and sleeping

## 2024-04-30 ENCOUNTER — TELEPHONE (OUTPATIENT)
Dept: OTOLARYNGOLOGY | Facility: CLINIC | Age: 84
End: 2024-04-30
Payer: MEDICARE

## 2024-04-30 NOTE — PROGRESS NOTES
"      Speech-Language Pathology      SLP Adult Outpatient Speech-Language Pathology Treatment        Patient Name: Yaritza Hua \"Jennifer\"  MRN: [id number]  Today's Date: 4/29/2024     Time Calculation  Start Time: 1425  Stop Time: 1510  Time Calculation (min): 45 min      SLP Assessment:  Patient saw Dr. Adair today for adjustment due to lingual sore. She said it feels better, yet some soreness persists. She did not try any oral foods this weekend, however, she typically does eat some pudding or nectar thick liquids. She is completing home exercise program.      SLP TX Intervention Outcome: Making Progress Towards Goals  Prognosis: Good  Treatment Tolerance: Patient tolerated treatment well  Strengths: Motivation, Cognition       Plan:  SLP TX Plan: Continue Plan of Care  SLP Plan: Skilled SLP  SLP Frequency: 2x per week  Duration: 30 days  Discussed POC: Patient  Discussed Risks/Benefits: Patient  Patient/Caregiver Agreeable: Yes      Subjective   Pleasant and participative    General Visit Information:   Certification Period Start Date: 04/02/24  Certification Period End Date: 05/02/24  Number of Authorized Treatments : MN  Total Number of Visits :  (8/10)      Pain Assessment:   Pain Assessment: 0-10  Pain Score: 0 - No pain      Objective     Goals:  Start date: 4/2/24  1. Patient will tolerate oral trials with SLP without pulmonary compromise.   Progress: agreeable to pudding trials with SLP today. Did not eat over the weekend.  Status: progressing      2. Patient will participate with Neuromuscular Electrical Stimulation in conjunction with PO intake to improve pharyngeal strength as appropriate.     Progress: Electrodes placed over the belly of the digastric during swallow exercises and functional trials.  Work: Rest 57:3   215 us   7.5 mA   5 min.   Trialed: pudding, minimal amount  Tolerated: c/o pain today during treatment, however, had PT prior for manual massage. Despite prepping and cleaning skin " multiple times, pain persisted, thus NMES stopped.  Status: progressing     3. Patient will participate with Deep Pharyngeal Neuromuscular electrical stimulation to improve oral strength and sensation.      Progress: completed 4 packets  Lingual groove  minimal   Palatal reflex - no ROM  Pharyngeal wall: minimal ROM  Status: progressing     4. Patient will improve trismus to allow for impressions for palatal obturator, improved speech and swallow function (35mm+ average opening):     Progress: decreased to 28mm, prior was Stable at 32mm   Status: progressing    5. Patient will complete home exercise program daily  Progress: completing daily.  Status: progressing    6. SLP to assist Dr. Adair with function of oral prosthesis related to speech/swallowing issues: Hold goal at this time, until repeat MBS completed.   Progress: Hold  Swallowing: Anterior oral residue with pudding, however, with posterior placement able to clear oral residue.   Status: ongoing     Long term goals: To improve overall swallow function.    Outpatient Education:  Adult Outpatient Education  Individual(s) Educated: Patient  Verbal Education : Reviewed home exercise program  Diagnosis and Precautions: aspiration  Risk and Benefits Discussed with Patient/Caregiver/Other: yes  Patient/Caregiver Demonstrated Understanding: yes  Plan of Care Discussed and Agreed Upon: yes  Patient Response to Education: Patient/Caregiver Verbalized Understanding of Information

## 2024-04-30 NOTE — TELEPHONE ENCOUNTER
"Email communication between Jennifer and myself.    \"I'm laughing here at your last paragraph.  So true, or the other one I hear all the time is that when people retire they spend all their time going to doctors.     It looks like you have the correct one--saline nasal spray.     I guess the take home here is that you are doing the right thing and that you should make sure not to run out of it.  J  Nice job!!     Bee     From: Jenniferjarod Hua <[email or web address]>   Sent: Tuesday, April 30, 2024 8:38 AM  To: Bee Downey <[email or web address]>  Subject: Re: A question or two     Thanks for the prompt reply. You are so \"on the ball\", I don't know what that office would do without you. Anyhow, the bottle just says Xlear nasal spray. See nothing saying it's a decongestant. If it helps, the ingredients are: Purified water,   Thanks for the prompt reply.  You are so \"on the ball\", I don't know what that office would do without you.     Anyhow, the bottle just says Xlear nasal spray.  See nothing saying it's a decongestant.  If it helps, the ingredients are: Purified water, Xylitol, USP Sodium Chloride, Grapefruit Seed Extract.  Hope I'm using the right one.     Why does everything seem to fall apart as we get older?  Thought these were supposed to be the mratinez years and the only thing remaining martinez is my urine.......lol.  Sorry couldn't resis that.     Jennifer           On Tuesday, April 30, 2024 at 08:01:29 AM Shayan RUVALCABA Cheryl <[email or web address]> wrote:         Johan Rodriguez--     What version of the Xlear are you using?  Is it just the saline nasal spray or is it the decongestant?     If it's just the saline nasal spray than yes, the bleeding was likely due to not using it.  Between allergies and unfortunately, just the aging process, our nasal mucosa does dry out easier and quicker than when we were younger.  This makes us more prone to nose bleeds.  The saline nasal spray helps keep our mucosa " moist.     Now, I will say if it's the one with the decongestant in it, we have a problem and need to talk.  The nasal decongestant should not be used for more then 3-5 days.  Prolonged use of it can cause rebound, meaning instead of decongesting a person, it causes congestion and inflammation.     Please let me know which version of the Xlear you are using.     Bee Blum     From: Jennifer Hua <[email or web address]   Sent: Monday, April 29, 2024 5:03 PM  To: Bee Downey <[email or web address]>  Subject: Re: A question or two     Johan Gamboa, I'm sorry not to have responded to your email sooner, but I left for my appointments without checking it again. I had been using Xlear but ran out of it about a week ago. Stopped at the drugstore before my first appointment today   Johan Gamboa,     I'm sorry not to have responded to your email sooner, but I left for my appointments without checking it again.     I had been using Xlear but ran out of it about a week ago.  Stopped at the drugstore before my first appointment today and bought some more.  I used it as soon as I got back in the car and then used it another time during the day between appointments.  Happy to say, No more blood when I blew my nose.     Do you think this was the reason that I experienced the blood clots and bleeding?  I know my allergies have gone bonkers with all the pollen the past week.  A simple as a cause possible because I ran out of the Xlear?          Jennifer     On Monday, April 29, 2024 at 11:43:30 AM Shayan RUVALCABA Cheryl <[email or web address]> wrote:         Naunjaneth Rodriguez--     Are you using saline nasal spray to help with dryness?     Bee Downey, MSN, RN, ACNS-BC, CORLN  Clinical Nurse Specialist  Certified Otorhinolaryngology Nurse  [phone]/fax [phone]             From: Jennifer Hua <[email or web address]>   Sent: Monday, April 29, 2024 7:59 AM  To: Bee Downey  "[email or web address]>  Subject: A question or two     Bee Prado.?.?.?.?.?. I hope you had a good weekend and was able to enjoy the summer like weather we finally had. I have a question which I hope you will be able to answer for me. On Thursday, I had a headache off and on right above my eyes and   Bee Prado......     I hope you had a good weekend and was able to enjoy the summer like weather we finally had.     I have a question which I hope you will be able to answer for me.  On Thursday, I had a headache off and on right above my eyes and it persisted into Friday.  Along with it, I would get a pain into my right temple.  I thought it was simply from allergies, but on Saturday morning when I blew my nose, I had blood clots within the mucus.  During the day, I had a couple of times upon blowing my nose that there was blood mixed in.  The same happened on Sunday.  Some blood clots in the morning and then just blood mixed in during the day.  No clots this morning, only blood mixed in.     Is this something that is cause for concern, and does it warrant an appointment with Dr. Camacho?  I have 3 appointments scheduled for today but, I have any other day this week free if you need to set an appointment for me with Dr. Camacho to have him check this out.  Any time would be fine.     Thanks so much, Bee.  I really appreciate it.     Jennifer\"    "

## 2024-05-01 ENCOUNTER — TREATMENT (OUTPATIENT)
Dept: SPEECH THERAPY | Facility: HOSPITAL | Age: 84
End: 2024-05-01
Payer: MEDICARE

## 2024-05-01 DIAGNOSIS — R47.1 DYSARTHRIA: ICD-10-CM

## 2024-05-01 DIAGNOSIS — R13.12 OROPHARYNGEAL DYSPHAGIA: Primary | ICD-10-CM

## 2024-05-01 DIAGNOSIS — R13.10 DYSPHAGIA: ICD-10-CM

## 2024-05-01 PROCEDURE — 92526 ORAL FUNCTION THERAPY: CPT | Mod: GN

## 2024-05-01 ASSESSMENT — PAIN - FUNCTIONAL ASSESSMENT: PAIN_FUNCTIONAL_ASSESSMENT: 0-10

## 2024-05-01 ASSESSMENT — PAIN SCALES - GENERAL: PAINLEVEL_OUTOF10: 0 - NO PAIN

## 2024-05-01 NOTE — PROGRESS NOTES
"      Speech-Language Pathology  30 day Progress Update      SLP Adult Outpatient Speech-Language Pathology Treatment        Patient Name: Yaritza Hua \"Jennifer\"  MRN: [id number]  Today's Date: 5/1/2024     Time Calculation  Start Time: 1115  Stop Time: 1200  Time Calculation (min): 45 min      SLP Assessment/Progress Update:  Adjustment recently completed by Dr. Adair has allowed lingual pain to subside. She is able to resume small amount of puree foods for trials at home, understanding aspriation risks. She completes Mao oral care protocol and home exercise program daily. Home program includes сергей, effortful swallow, lingual resistance exercises, and masseter stretches. Trimus is fluctuating between 28mm - 32mm (average goal is 35mm+). Patient is attending physical therapy to further address trismus. Her long term goal remains to safely eat pleasure foods. She is stimulable and making slow, yet steady progress as is typical when post radiation changes. Continue with OP therapy 2x weekly addressing goals listed. MBS to be completed for further assessment of current prosthesis fit and swallow function with prosthesis in place.    SLP TX Intervention Outcome: Making Progress Towards Goals  Prognosis: Good  Treatment Tolerance: Patient tolerated treatment well  Strengths: Motivation, Cognition       Plan:  SLP TX Plan: Continue Plan of Care  SLP Plan: Skilled SLP  SLP Frequency: 2x per week  Duration: 30 days  Discussed POC: Patient  Discussed Risks/Benefits: Patient  Patient/Caregiver Agreeable: Yes      Subjective   Pleasant and cooperative    General Visit Information:   Certification Period Start Date: 05/01/24  Certification Period End Date: 05/31/24  Number of Authorized Treatments : MN  Total Number of Visits :  (1/8)      Pain Assessment:   Pain Assessment: 0-10  Pain Score: 0 - No pain      Objective     Goals:  Reviewed date: 5/1/24  Anticipated completion date: 4 weeks.  1. Patient will tolerate " oral trials with SLP without pulmonary compromise. ONGOING GOAL  Progress: agreeable to pudding trials with SLP today. Did not eat over the weekend.  Status: progressing      2. Patient will participate with Neuromuscular Electrical Stimulation in conjunction with PO intake to improve pharyngeal strength as appropriate. ONGOING GOAL    Progress: Electrodes placed over the belly of the digastric during swallow exercises and functional trials.  Work: Rest 57:3   210 us   8.5 mA   15 min.   Trialed: pudding, and nectar thick liquids  Tolerated: Noted coughing throughout session today. Reviewed effortful swallow - will review sEMG next session.  Status: progressing     3. Patient will participate with Deep Pharyngeal Neuromuscular electrical stimulation to improve oral strength and sensation. ONGOING GOAL     Progress: completed 4 packets  Lingual groove  minimal   Palatal reflex - no ROM due to anatomical changes  Pharyngeal wall: unable to visualize today  Status: progressing     4. Patient will improve trismus to allow for impressions for palatal obturator, improved speech and swallow function (35mm+ average opening): ONGOING GOAL    Progress: stable at 28mm, prior was Stable at 32mm   Status: progressing    5. Patient will complete home exercise program daily ONGOING GOAL  Progress: completing daily.  Status: progressing    6. SLP to assist Dr. Adair with function of oral prosthesis related to speech/swallowing issues: Hold goal at this time, until repeat MBS completed.   Progress: Hold  Swallowing: Anterior oral residue with pudding, however, with posterior placement able to clear oral residue.   Status: ongoing     Long term goals: To improve overall swallow function.    Outpatient Education:  Adult Outpatient Education  Individual(s) Educated: Patient  Verbal Education : Reviewed home exercise program  Diagnosis and Precautions: aspiration  Risk and Benefits Discussed with Patient/Caregiver/Other:  yes  Patient/Caregiver Demonstrated Understanding: yes  Plan of Care Discussed and Agreed Upon: yes  Patient Response to Education: Patient/Caregiver Verbalized Understanding of Information

## 2024-05-01 NOTE — Clinical Note
May 1, 2024    Garry Adair DDS  [address] [facility]   [address] [place] [id number]    Patient: Jennifer Hua   YOB: 1940   Date of Visit: 5/1/2024       Dear Garry Adair DDS  [address] [facility]  [address] [place] [address]    The attached plan of care is being sent to you because your patient’s medical reimbursement requires that you certify the plan of care. Your signature is required to allow uninterrupted insurance coverage.      You may indicate your approval by signing below and faxing this form back to us at No information on file..    Please call Dept: [phone] with any questions or concerns.    Thank you for this referral,        JANETTE Pino  [facility]  [address] [address]  [facility] [id number] [phone]    Payer: Payor: MEDICAL [facility] MEDICARE / Plan: [facility] MEDICARE / Product Type: *No Product type* /                                                                         Date:     Dear JANETTE Pino,     Re: Ms. Yaritza Hua, MRN:[id number]    I certify that I have reviewed the attached plan of care and it is medically necessary for Ms. Yaritza Hua (1940) who is under my care.          ______________________________________                    _________________  Provider name and credentials                                           Date and time                                                                                           Plan of Care 5/1/24   Effective from: 5/1/2024  Effective to: 5/31/2024    Plan ID: [id number]            Participants as of Finalize on 5/1/2024    Name Type Comments Contact Info    Garry Adair DDS Referring Physician Please review and sign        Last Plan Note     Author: JANETTE Pino Status: Sign when Signing Visit Last edited: 5/1/2024 11:00 AM             Speech-Language Pathology  30 day Progress Update      SLP Adult  "Outpatient Speech-Language Pathology Treatment        Patient Name: Yaritza Hua \"Jennifer\"  MRN: [id number]  Today's Date: 5/1/2024     Time Calculation  Start Time: 1115  Stop Time: 1200  Time Calculation (min): 45 min      SLP Assessment/Progress Update:  Adjustment recently completed by Dr. Adair has allowed lingual pain to subside. She is able to resume small amount of puree foods for trials at home, understanding aspriation risks. She completes Mao oral care protocol and home exercise program daily. Home program includes сергей, effortful swallow, lingual resistance exercises, and masseter stretches. Trimus is fluctuating between 28mm - 32mm (average goal is 35mm+). Patient is attending physical therapy to further address trismus. Her long term goal remains to safely eat pleasure foods. She is stimulable and making slow, yet steady progress as is typical when post radiation changes. Continue with OP therapy 2x weekly addressing goals listed. MBS to be completed for further assessment of current prosthesis fit and swallow function with prosthesis in place.    SLP TX Intervention Outcome: Making Progress Towards Goals  Prognosis: Good  Treatment Tolerance: Patient tolerated treatment well  Strengths: Motivation, Cognition       Plan:  SLP TX Plan: Continue Plan of Care  SLP Plan: Skilled SLP  SLP Frequency: 2x per week  Duration: 30 days  Discussed POC: Patient  Discussed Risks/Benefits: Patient  Patient/Caregiver Agreeable: Yes      Subjective   Pleasant and cooperative    General Visit Information:   Certification Period Start Date: 05/01/24  Certification Period End Date: 05/31/24  Number of Authorized Treatments : MN  Total Number of Visits :  (1/8)      Pain Assessment:   Pain Assessment: 0-10  Pain Score: 0 - No pain      Objective     Goals:  Reviewed date: 5/1/24  Anticipated completion date: 4 weeks.  1. Patient will tolerate oral trials with SLP without pulmonary compromise. ONGOING " GOAL  Progress: agreeable to pudding trials with SLP today. Did not eat over the weekend.  Status: progressing      2. Patient will participate with Neuromuscular Electrical Stimulation in conjunction with PO intake to improve pharyngeal strength as appropriate. ONGOING GOAL    Progress: Electrodes placed over the belly of the digastric during swallow exercises and functional trials.  Work: Rest 57:3   210 us   8.5 mA   15 min.   Trialed: pudding, and nectar thick liquids  Tolerated: Noted coughing throughout session today. Reviewed effortful swallow - will review sEMG next session.  Status: progressing     3. Patient will participate with Deep Pharyngeal Neuromuscular electrical stimulation to improve oral strength and sensation. ONGOING GOAL     Progress: completed 4 packets  Lingual groove  minimal   Palatal reflex - no ROM due to anatomical changes  Pharyngeal wall: unable to visualize today  Status: progressing     4. Patient will improve trismus to allow for impressions for palatal obturator, improved speech and swallow function (35mm+ average opening): ONGOING GOAL    Progress: stable at 28mm, prior was Stable at 32mm   Status: progressing    5. Patient will complete home exercise program daily ONGOING GOAL  Progress: completing daily.  Status: progressing    6. SLP to assist Dr. Adair with function of oral prosthesis related to speech/swallowing issues: Hold goal at this time, until repeat MBS completed.   Progress: Hold  Swallowing: Anterior oral residue with pudding, however, with posterior placement able to clear oral residue.   Status: ongoing     Long term goals: To improve overall swallow function.    Outpatient Education:  Adult Outpatient Education  Individual(s) Educated: Patient  Verbal Education : Reviewed home exercise program  Diagnosis and Precautions: aspiration  Risk and Benefits Discussed with Patient/Caregiver/Other: yes  Patient/Caregiver Demonstrated Understanding: yes  Plan of Care  Discussed and Agreed Upon: yes  Patient Response to Education: Patient/Caregiver Verbalized Understanding of Information         Current Participants as of 5/1/2024    Name Type Comments Contact Info    Garry Adair DDS Referring Physician Please review and sign     Signature pending

## 2024-05-06 ENCOUNTER — TELEPHONE (OUTPATIENT)
Dept: OTOLARYNGOLOGY | Facility: HOSPITAL | Age: 84
End: 2024-05-06
Payer: MEDICARE

## 2024-05-06 ENCOUNTER — TREATMENT (OUTPATIENT)
Dept: SPEECH THERAPY | Facility: HOSPITAL | Age: 84
End: 2024-05-06
Payer: MEDICARE

## 2024-05-06 DIAGNOSIS — R13.10 DYSPHAGIA: ICD-10-CM

## 2024-05-06 DIAGNOSIS — R13.12 OROPHARYNGEAL DYSPHAGIA: Primary | ICD-10-CM

## 2024-05-06 DIAGNOSIS — R47.1 DYSARTHRIA: ICD-10-CM

## 2024-05-06 PROCEDURE — 92526 ORAL FUNCTION THERAPY: CPT | Mod: GN

## 2024-05-06 ASSESSMENT — PAIN - FUNCTIONAL ASSESSMENT: PAIN_FUNCTIONAL_ASSESSMENT: 0-10

## 2024-05-06 ASSESSMENT — PAIN SCALES - GENERAL: PAINLEVEL_OUTOF10: 6

## 2024-05-06 NOTE — PROGRESS NOTES
"      Speech-Language Pathology  30 day Progress Update      SLP Adult Outpatient Speech-Language Pathology Treatment        Patient Name: Yaritza Hua \"Jennifer\"  MRN: [id number]  Today's Date: 5/1/2024               Time Calculation  Start Time: 1115  Stop Time: 1200  Time Calculation (min): 45 min      SLP Assessment/Progress Update:  Adjustment recently completed by Dr. Adair has allowed lingual pain to subside. She is able to resume small amount of puree foods for trials at home, understanding aspriation risks. She completes Mao oral care protocol and home exercise program daily. Home program includes сергей, effortful swallow, lingual resistance exercises, and masseter stretches. Trimus is fluctuating between 28mm - 32mm (average goal is 35mm+). Patient is attending physical therapy to further address trismus. Her long term goal remains to safely eat pleasure foods. She is stimulable and making slow, yet steady progress as is typical when post radiation changes. Continue with OP therapy 2x weekly addressing goals listed. MBS to be completed for further assessment of current prosthesis fit and swallow function with prosthesis in place.    SLP TX Intervention Outcome: Making Progress Towards Goals  Prognosis: Good  Treatment Tolerance: Patient tolerated treatment well  Strengths: Cognition, Motivation       Plan:  SLP TX Plan: Continue Plan of Care  SLP Plan: Skilled SLP  SLP Frequency: 2x per week  Duration: 30 days  Discussed POC: Patient  Discussed Risks/Benefits: Patient  Patient/Caregiver Agreeable: Yes      Subjective   Pleasant and cooperative    General Visit Information:   Certification Period Start Date: 05/01/24  Certification Period End Date: 05/31/24  Number of Authorized Treatments : MN  Total Number of Visits :  (2/8)      Pain Assessment:   Pain Assessment: 0-10  Pain Score: 6  Pain Type: Acute pain  Pain Location: Ear (mandible)  Pain Orientation: Right      Objective     Goals:  Reviewed " date: 5/1/24  Anticipated completion date: 4 weeks.  1. Patient will tolerate oral trials with SLP without pulmonary compromise. ONGOING GOAL  Progress: agreeable to pudding trials with SLP today. Did not eat over the weekend.  Status: progressing      2. Patient will participate with Neuromuscular Electrical Stimulation in conjunction with PO intake to improve pharyngeal strength as appropriate. ONGOING GOAL    Progress: Electrodes placed over the belly of the digastric during swallow exercises and functional trials.  Work: Rest 57:3   210 us   8.5 mA   15 min.   Trialed: pudding, and nectar thick liquids  Tolerated: Noted coughing throughout session today. Reviewed effortful swallow - will review sEMG next session.  Status: progressing     3. Patient will participate with Deep Pharyngeal Neuromuscular electrical stimulation to improve oral strength and sensation. ONGOING GOAL     Progress: completed 4 packets  Lingual groove  minimal   Palatal reflex - no ROM due to anatomical changes  Pharyngeal wall: unable to visualize today  Status: progressing     4. Patient will improve trismus to allow for impressions for palatal obturator, improved speech and swallow function (35mm+ average opening): ONGOING GOAL    Progress: stable at 28mm, prior was Stable at 32mm   Status: progressing    5. Patient will complete home exercise program daily ONGOING GOAL  Progress: completing daily.  Status: progressing    6. SLP to assist Dr. Adair with function of oral prosthesis related to speech/swallowing issues: Hold goal at this time, until repeat MBS completed.   Progress: Hold  Swallowing: Anterior oral residue with pudding, however, with posterior placement able to clear oral residue.   Status: ongoing     Long term goals: To improve overall swallow function.    Outpatient Education:  Adult Outpatient Education  Individual(s) Educated: Patient  Verbal Education : Reviewed home exercise program  Diagnosis and Precautions:  "aspiration  Risk and Benefits Discussed with Patient/Caregiver/Other: yes  Patient/Caregiver Demonstrated Understanding: yes  Plan of Care Discussed and Agreed Upon: yes  Patient Response to Education: Patient/Caregiver Verbalized Understanding of Information        Speech-Language Pathology    SLP Adult Outpatient Speech-Language Pathology Treatment     Patient Name: Yaritza Hua \"Carmen"  MRN: [id number]  Today's Date: 5/6/2024            Current Problem:   1. Dysphagia  Follow Up In Speech Therapy      2. Dysarthria  Follow Up In Speech Therapy            SLP Assessment:  SLP TX Intervention Outcome: Making Progress Towards Goals  Prognosis: Good  Treatment Tolerance: Patient tolerated treatment well  Strengths: Cognition, Motivation       Plan:  SLP TX Plan: Continue Plan of Care  SLP Plan: Skilled SLP  SLP Frequency: 2x per week  Duration: 30 days  Discussed POC: Patient  Discussed Risks/Benefits: Patient  Patient/Caregiver Agreeable: Yes      Subjective   Current Problem:  ***    Most Recent Visit:       General Visit Information:   Certification Period Start Date: 05/01/24  Certification Period End Date: 05/31/24  Number of Authorized Treatments : MN  Total Number of Visits :  (2/8)      Pain Assessment:   Pain Assessment: 0-10  Pain Score: 6  Pain Type: Acute pain  Pain Location: Ear (mandible)  Pain Orientation: Right      Objective       Therapeutic Swallow:         Motor Speech:          Augmentative Device Training:          Cognitive Linguistics:         Language Expression:         Language Comprehension:         Voice:         Hearing:         Fluency:         Comments:         SLP Outcome Measures:  {SLP Outcome Measures:23272}           Outpatient Education:  Adult Outpatient Education  Individual(s) Educated: Patient  Verbal Education : Reviewed home exercise program  Diagnosis and Precautions: aspiration  Risk and Benefits Discussed with Patient/Caregiver/Other: yes  Patient/Caregiver " Demonstrated Understanding: yes  Plan of Care Discussed and Agreed Upon: yes  Patient Response to Education: Patient/Caregiver Verbalized Understanding of Information      Inpatient:  Education Documentation  No documentation found.  Education Comments  No comments found.        Consultations/Referrals/Coordination of Services: ***

## 2024-05-06 NOTE — TELEPHONE ENCOUNTER
"Email received and communication exchange with Jennifer:    \"Thanks, Bee, for getting back to me right away.      I'll try my primary doctor first as you suggested.     Thanks again and have a good day.    Jennifer          On Monday, May 6, 2024 at 07:08:59 AM Shayan RUVALCABA Cheryl <[email or web address]> wrote:       Good morning Jennifer--     Unfortunately, Dr. Camacho is out of the office this week and next week.       I might suggest that you start by seeing your primary care.  If not, I can try to get you into see one of his partners.  Based on your schedule and her's it would be at 7:45 a.m.  on Thursday at the main campus in the [facility].     Please tell me how you would like to proceed.     ThanksBee     From: Jennifer Parkerlinda <[email or web address]>   Sent: Harry, May 5, 2024 5:21 PM  To: Bee Downey <[email or web address]>  Subject: Think more than allegies     Hi Bee, I hate to be a bother but I think I have more than just allergies going on with me and need an appointment to see Dr. Camacho. This whole weekend, I've experienced an extraordinary amount of sticky white secretions that just drip   Hi Bee,     I hate to be a bother but I think I have more than just allergies going on with me and need an appointment to see Dr. Camacho.       This whole weekend, I've experienced an extraordinary amount of sticky white secretions that just drip from my mouth. I'm going through massive amounts of tissue wiping them away.  I have no idea where they are emanating from, but they are continuous.  Besides that, I am experiencing shooting pains in my right ear that sometime radiate into my jaw.  Been popping a good amount of Advil to ease the pain but being on warfarin, I don't really like taking it. And my jaw and neck are so tight that they hurt to touch either of them.  Also, I notice my speech is being affected because of the number of secretions that I can't swallow due to " "the excessive tightness in my throat which I assume is due to the tightness in my neck. I don't know if my jaw is causing the pain in my ear, or the ear is causing the pain in my jaw.  All I know is that it hurts.     I have appointments on Wednesday and Thursday but nothing on Tuesday or Friday.  However, if Dr. Camacho would want to see me on either of the days I have with an appointment, I'd be more than willing to cancel it.  I might be overreacting or overly cautious, but it is really causing me a lot of discomfort.  By the way, I also have an appointment for today which would be a little late for me to cancel.  Any wother day, if you deem necessary would be fine.     Thanks in advance, Bee, for any advice or for setting up an appointment for me to see Dr. Camacho.     Jennifer\"    "

## 2024-05-06 NOTE — PROGRESS NOTES
"Speech-Language Pathology    SLP Adult Outpatient Speech-Language Pathology Treatment     Patient Name: Yaritza Hua \"Jennifer\"  MRN: [id number]  Today's Date: 5/6/2024     Time Calculation  Start Time: 1305  Stop Time: 1350  Time Calculation (min): 45 min      SLP Assessment:  Patient developed a bad earache 5/3/24. She called Dr. Camacho's office and her PCP. She will see a physician to determine etiology of pain.  She is noting that her anterior neck has marked increase in tension. Upon SLP palpation tension was marked today. She reports trialing pudding, applesauce and nectar thick liquids at home and \"nothing will go down\".  Due to pain today she deferred NMES and wished to complete DPNS only.     SLP TX Intervention Outcome: Making Progress Towards Goals  Prognosis: Good  Treatment Tolerance: Patient tolerated treatment well  Strengths: Cognition, Motivation       Plan:  SLP TX Plan: Continue Plan of Care  SLP Plan: Skilled SLP  SLP Frequency: 2x per week  Duration: 30 days  Discussed POC: Patient  Discussed Risks/Benefits: Patient  Patient/Caregiver Agreeable: Yes      Subjective   Cooperative    General Visit Information:   Certification Period Start Date: 05/01/24  Certification Period End Date: 05/31/24  Number of Authorized Treatments : MN  Total Number of Visits :  (2/8)      Pain Assessment:   Pain Assessment: 0-10  Pain Score: 6  Pain Type: Acute pain  Pain Location: Ear (mandible)  Pain Orientation: Right      Objective     Goals:  Reviewed date: 5/1/24  Anticipated completion date: 4 weeks.  1. Patient will tolerate oral trials with SLP without pulmonary compromise.   Progress:   Status: progressing      2. Patient will participate with Neuromuscular Electrical Stimulation in conjunction with PO intake to improve pharyngeal strength as appropriate.     Progress: Electrodes placed over the belly of the digastric during swallow exercises and functional trials.  Work: Rest 57:3   210 us   8.5 mA   12 " min.   Trialed: completed сергей, pitch glides and 2 sips of nectar thick liquids    Tolerated: pt noted difficulty with swallow and expectorated mucous. Oral intake then stopped  Status: progressing     3. Patient will participate with Deep Pharyngeal Neuromuscular electrical stimulation to improve oral strength and sensation. N/a     Progress: completed 4 packets  Lingual groove  minimal   Palatal reflex - no ROM due to anatomical changes  Pharyngeal wall: unable to visualize today  Status: progressing     4. Patient will improve trismus to allow for impressions for palatal obturator, improved speech and swallow function (35mm+ average opening): n/a    Progress: stable at 28mm, prior was Stable at 32mm   Status: progressing    5. Patient will complete home exercise program daily n/a  Progress: completing daily.  Status: progressing    6. SLP to assist Dr. Adair with function of oral prosthesis related to speech/swallowing issues: Hold goal at this time, until repeat MBS completed.   Progress: Hold  Swallowing: Anterior oral residue with pudding, however, with posterior placement able to clear oral residue.   Status: ongoing     Long term goals: To improve overall swallow function.    Outpatient Education:  Adult Outpatient Education  Individual(s) Educated: Patient  Verbal Education : Reviewed home exercise program  Diagnosis and Precautions: aspiration  Risk and Benefits Discussed with Patient/Caregiver/Other: yes  Patient/Caregiver Demonstrated Understanding: yes  Plan of Care Discussed and Agreed Upon: yes  Patient Response to Education: Patient/Caregiver Verbalized Understanding of Information

## 2024-05-08 ENCOUNTER — APPOINTMENT (OUTPATIENT)
Dept: SPEECH THERAPY | Facility: HOSPITAL | Age: 84
End: 2024-05-08
Payer: MEDICARE

## 2024-05-09 ENCOUNTER — APPOINTMENT (OUTPATIENT)
Dept: PHYSICAL THERAPY | Facility: CLINIC | Age: 84
End: 2024-05-09
Payer: MEDICARE

## 2024-05-10 ENCOUNTER — OFFICE VISIT (OUTPATIENT)
Dept: OTOLARYNGOLOGY | Facility: HOSPITAL | Age: 84
End: 2024-05-10
Payer: MEDICARE

## 2024-05-10 VITALS — TEMPERATURE: 98.4 F | HEIGHT: 63 IN | WEIGHT: 103.3 LBS | BODY MASS INDEX: 18.3 KG/M2

## 2024-05-10 DIAGNOSIS — C01 CANCER OF BASE OF TONGUE (MULTI): ICD-10-CM

## 2024-05-10 DIAGNOSIS — E03.9 ACQUIRED HYPOTHYROIDISM: Primary | ICD-10-CM

## 2024-05-10 DIAGNOSIS — R13.12 OROPHARYNGEAL DYSPHAGIA: ICD-10-CM

## 2024-05-10 DIAGNOSIS — R22.1 NECK SWELLING: ICD-10-CM

## 2024-05-10 PROCEDURE — 99213 OFFICE O/P EST LOW 20 MIN: CPT | Performed by: OTOLARYNGOLOGY

## 2024-05-10 PROCEDURE — 1036F TOBACCO NON-USER: CPT | Performed by: OTOLARYNGOLOGY

## 2024-05-10 PROCEDURE — 1159F MED LIST DOCD IN RCRD: CPT | Performed by: OTOLARYNGOLOGY

## 2024-05-10 PROCEDURE — 31575 DIAGNOSTIC LARYNGOSCOPY: CPT | Performed by: OTOLARYNGOLOGY

## 2024-05-10 PROCEDURE — 1160F RVW MEDS BY RX/DR IN RCRD: CPT | Performed by: OTOLARYNGOLOGY

## 2024-05-10 PROCEDURE — 1157F ADVNC CARE PLAN IN RCRD: CPT | Performed by: OTOLARYNGOLOGY

## 2024-05-10 RX ORDER — AMOXICILLIN AND CLAVULANATE POTASSIUM 875; 125 MG/1; MG/1
TABLET, FILM COATED ORAL
COMMUNITY
Start: 2024-05-08 | End: 2024-05-18

## 2024-05-10 NOTE — PROGRESS NOTES
Provider Impressions     Large base of tongue lesion which was treated with a combination of chemotherapy and radiation. There is no evidence of any tumor recurrence.      Status post surgery and radiation therapy for a second primary on the contralateral oral tongue and oropharynx.  Her recent PET scan is negative.  I do not see any evidence of tumor recurrence.     Dysphagia.  She is PEG dependent.      Hypothyroidism for which she is on Synthroid.     Recent right-sided facial and ear discomfort along with some swelling and pain around the mandible.  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  She is to continue with the Augmentin.  A CT scan will be obtained.       I will see her after the scan.     Chief Complaint     Follow-up status post surgery for the management of a left oral cavity and oropharyngeal cancer.      History of Present Illness    This lady was seen for the management of a squamous cell carcinoma of the base of tongue. She was diagnosed with a T4 N0 lesion of her base of tongue in February 2018. The tumor was P 16 positive. She was treated with a combination of chemotherapy and radiation and completed her treatments on April 25, 2018. She had a PET scan in March 2019 which was negative. She has had a TSH level in January 2024 which was normal. She is on Synthroid. She had some chest scanning in February 2023 which was negative. She was seen in January 2023 because of a concern about a left tongue lesion. On biopsy this turned out to be consistent with squamous cell carcinoma. She had surgery on March 2, 2023. This was followed by radiation therapy. She has had 2 swallow evaluation which she failed. She remains PEG dependent.  She is here today because for the past week or so she has had some discomfort on the right side of her face and ear.  This was followed by some swelling on the lower aspect of her face.  She has been put on Augmentin.    Physical Exam    Examination of the oral cavity shows a  lot of changes from the prior surgeries.  I cannot appreciate any worrisome mucosal lesions.  She does have a full dentition.  There is no loose teeth.  The oropharyngeal inlet is now of good size. Palpation of the parotid, neck, thyroid feel fails to show any worrisome masses or adenopathies.  There is a lot of stiffness.  Palpation of the side of the face and upper neck shows a very tender area over the midportion of the horizontal mandible.     A flexible laryngoscopy was carried out. Under topical Xylocaine and Darío-Synephrine the scope was introduced through the nostril. The nasopharynx, base of tongue, hypopharynx, and larynx are visualized. The vocal cords are normally mobile. There is significant pooling of secretions in the pharynx. The airway is adequate.  I cannot appreciate any mucosal lesions.

## 2024-05-13 ENCOUNTER — APPOINTMENT (OUTPATIENT)
Dept: SPEECH THERAPY | Facility: HOSPITAL | Age: 84
End: 2024-05-13
Payer: MEDICARE

## 2024-05-15 ENCOUNTER — APPOINTMENT (OUTPATIENT)
Dept: PHYSICAL THERAPY | Facility: CLINIC | Age: 84
End: 2024-05-15
Payer: MEDICARE

## 2024-05-15 ENCOUNTER — APPOINTMENT (OUTPATIENT)
Dept: SPEECH THERAPY | Facility: HOSPITAL | Age: 84
End: 2024-05-15
Payer: MEDICARE

## 2024-05-20 ENCOUNTER — TELEPHONE (OUTPATIENT)
Dept: OTOLARYNGOLOGY | Facility: HOSPITAL | Age: 84
End: 2024-05-20
Payer: MEDICARE

## 2024-05-20 DIAGNOSIS — M27.2 OSTEORADIONECROSIS OF JAW: ICD-10-CM
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
## 2024-05-20 RX ORDER — AMOXICILLIN AND CLAVULANATE POTASSIUM 875; 125 MG/1; MG/1
875 TABLET, FILM COATED ORAL 2 TIMES DAILY
Qty: 60 TABLET | Refills: 1 | Status: SHIPPED | OUTPATIENT
Start: 2024-05-20

## 2024-05-20 NOTE — PROGRESS NOTES
"Email received from Jennifer while I was out of the office last week along with my response this afternoon:    \"From: Jennifer Parkerlinda <[email or web address]>   Sent: Wednesday, May 15, 2024 8:09 AM  To: Bee Downey <[email or web address]>  Subject: Medications    Good morning, Bee, I was wondering if you have made any progress in finding an alternative that I could put through my PEG tube for the vitamin E and the other medication (can't remember the name) that Dr. Camacho wanted to put me for this     Also, I will be finished taking the Augmentin on Saturday.  Should I take something else when this is gone?  There isn't a refill on the prescription.  By the way, some of the swelling has abated somewhat from what it was last Friday.    Jennifer\"    My email response sent today at 1:20 p.m.:    \"Good afternoon Jennifer--    I'm waiting to hear back from Dr. Camacho about the antibiotic.    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     You might want to check with your insurance, unless you already know, if they cover compounded medications.  The pentoxifylline that can be obtained at normal pharmacies is an extended release tablet, so it CANNOT be crushed and put through a feeding tube.  There is a compounding pharmacy in [place] that can compound the medication into a liquid.  You would have to pay cash for it and if your insurance does cover compounded medications, you would have to submit for reimbursement.  I'm told that you would be looking at approximately $230.00 per month.  Please know this is not a one month and done medication.  This will be ongoing for some time, exactly how long, I don't know.  The Vitamin E can be obtained in a liquid form through ([email or web address]).  It looks to be $9.89 per bottle and you would need to take 18 drops a day for 180 mg (6 " "gtts=60 mg).  I've not used this site before, but it was recommended by our Naturopathic Doctor at .  It appears since it's over the counter, you can create your own account and order directly.   I'm not sure if your local pharmacy carries the liquid version on their shelf, but I've never seen it in the stores, only the gel caps.  Another thought would be the [facility].  I'm not sure if they carry it in liquid form.    Please let me know what you would like to do.  I certainly understand that $230.00 a month might be a bit much to add to the budget.  I'll [place] back with you as soon as I hear back from Dr. Camacho.    Thanks,    Bee Downey, MSN, RN, ACNS-BC, ROSEMARY  Clinical Nurse Specialist  Certified Otorhinolaryngology Nurse  [phone]/fax [phone]\"        "

## 2024-05-20 NOTE — TELEPHONE ENCOUNTER
----- Message from Gerardo Camacho MD sent at 5/20/2024  2:36 PM EDT -----  Regarding: RE: ATB  Full dosage  ----- Message -----  From: SONDRA Lehman-CNS  Sent: 5/20/2024   1:01 PM EDT  To: Gerardo Camacho MD  Subject: ATB                                              Boss, do you want Ms. Hua to remain on the ATB and if so full dosing or 1/2 dosing?    CT scan is scheduled for this Friday.

## 2024-05-21 ENCOUNTER — APPOINTMENT (OUTPATIENT)
Dept: SPEECH THERAPY | Facility: HOSPITAL | Age: 84
End: 2024-05-21
Payer: MEDICARE

## 2024-05-22 ENCOUNTER — APPOINTMENT (OUTPATIENT)
Dept: PHYSICAL THERAPY | Facility: CLINIC | Age: 84
End: 2024-05-22
Payer: MEDICARE

## 2024-05-22 ENCOUNTER — APPOINTMENT (OUTPATIENT)
Dept: SPEECH THERAPY | Facility: HOSPITAL | Age: 84
End: 2024-05-22
Payer: MEDICARE

## 2024-05-23 ENCOUNTER — TELEPHONE (OUTPATIENT)
Dept: OTOLARYNGOLOGY | Facility: CLINIC | Age: 84
End: 2024-05-23
Payer: MEDICARE

## 2024-05-23 LAB
NON-UH HIE CREATININE: 0.5 MG/DL (ref 0.5–0.8)
NON-UH HIE GFR AA: >60
NON-UH HIE GLOMERULAR FILTRATION RATE: >60 ML/MIN/1.73M?

## 2024-05-23 NOTE — TELEPHONE ENCOUNTER
"Jennifer emailed me back at 1:55 p.m.:    \"From: Jennifer Hua <[email or web address]>   Sent: Thursday, May 23, 2024 1:55 PM  To: Bee Downey <[email or web address]>  Subject: Re: labs     Hi Bee.?.?.?.?.?.?. I'm sorry I'm late answering this. I wasn't home at all today until a short time ago. I didn't know I needed blood work. I wasn't informed of that so I haven't had any done. All I have is the paperwork you gave me for the scan   Hi Bee.......     I'm sorry I'm late answering this.  I wasn't home at all today until a short time ago.     I didn't know I needed blood work.  I wasn't informed of that so I haven't had any done.  All I have is the paperwork you gave me for the scan and there is nothing on it about blood work.     There is a [facility] in [place].  Could you send an order there and I could get it done as soon as you let me know.  It is only minutes from me.  I don't want to cancel the scan.     Thanks.     Jennifer\"    My follow up response at 2:03 p.m.:    \"Johan Rodriguez, yes you were told.  I told you at the time of the appointment on 5/10 and asked that you stop at the lab on the way out.     The order is in the system so they should be able to see it.  Please go ASAP over there.     Thanks,     Bee\"    Her reply back at 2:08 p.m.:    \"Sorry, I just don't remember it.  I'm on my way now to get it done.\"    I let radiology know.        "

## 2024-05-23 NOTE — TELEPHONE ENCOUNTER
"I was contacted by [facility] radiology to see about Jennifer's creat level as her CT is scheduled for tomorrow.    I reviewed and updated the chart and see no results.  I emailed Jennifer about it and will await her response:    \" Good morning Jennifer--    Radiology is contacting me about the results of the blood work that they need before your CT scan tomorrow.    Did you have the blood work done and if so, where?  We need the results sent ASAP, otherwise your scan will need to be cancelled and rescheduled.  Please have them fax the results to me at [phone].    Thanks,    Bee Downey, MSN, RN, ACNS-BC, CORLN  Clinical Nurse Specialist  Certified Otorhinolaryngology Nurse\"      "

## 2024-05-24 ENCOUNTER — HOSPITAL ENCOUNTER (OUTPATIENT)
Dept: RADIOLOGY | Facility: CLINIC | Age: 84
Discharge: HOME | End: 2024-05-24
Payer: MEDICARE

## 2024-05-24 DIAGNOSIS — C01 CANCER OF BASE OF TONGUE (MULTI): ICD-10-CM

## 2024-05-24 DIAGNOSIS — R22.1 NECK SWELLING: ICD-10-CM

## 2024-05-24 PROCEDURE — 70491 CT SOFT TISSUE NECK W/DYE: CPT

## 2024-05-24 PROCEDURE — 2550000001 HC RX 255 CONTRASTS: Performed by: OTOLARYNGOLOGY

## 2024-05-24 PROCEDURE — 70491 CT SOFT TISSUE NECK W/DYE: CPT | Performed by: RADIOLOGY

## 2024-05-24 RX ADMIN — IOHEXOL 70 ML: 350 INJECTION, SOLUTION INTRAVENOUS at 11:22

## 2024-05-27 NOTE — PROGRESS NOTES
Provider Impressions     Large base of tongue lesion which was treated with a combination of chemotherapy and radiation. There is no evidence of any tumor recurrence.      Status post surgery and radiation therapy for a second primary on the contralateral oral tongue and oropharynx.  Her recent PET scan is negative.  I do not see any evidence of tumor recurrence.     Dysphagia.  She is PEG dependent.      Hypothyroidism for which she is on Synthroid.     Recent right-sided facial and ear discomfort along with some swelling and pain around the mandible.  This has responded to antibiotics.  She is to continue with the Augmentin.  There is nothing obvious on the CT scan.  She is to see her dentist this week.     I will see her in a month.    Chief Complaint     Follow-up status post surgery for the management of a left oral cavity and oropharyngeal cancer.      History of Present Illness    This lady was seen for the management of a squamous cell carcinoma of the base of tongue. She was diagnosed with a T4 N0 lesion of her base of tongue in February 2018. The tumor was P 16 positive. She was treated with a combination of chemotherapy and radiation and completed her treatments on April 25, 2018. She had a PET scan in March 2019 which was negative. She has had a TSH level in January 2024 which was normal. She is on Synthroid. She had some chest scanning in February 2023 which was negative. She was seen in January 2023 because of a concern about a left tongue lesion. On biopsy this turned out to be consistent with squamous cell carcinoma. She had surgery on March 2, 2023. This was followed by radiation therapy. She has had 2 swallow evaluation which she failed. She remains PEG dependent.  I saw her in May 2024 with some swelling around the right jaw.  She has responded to antibiotics.  I did send her for CT scanning which really did not show any obvious radionecrosis.  I personally reviewed those scans.    Physical  Exam    Examination of the oral cavity shows a lot of changes from the prior surgeries.  I cannot appreciate any worrisome mucosal lesions.  She does have a full dentition.  There is no loose teeth.  She does have a few caps.  The oropharyngeal inlet is now of good size. Palpation of the parotid, neck, thyroid feel fails to show any worrisome masses or adenopathies.  There is a lot of stiffness.  Palpation of the side of the face and upper neck shows some slight nodularity on the side of the mandible but nothing really suspicious.  There is no more discomfort.

## 2024-05-28 ENCOUNTER — OFFICE VISIT (OUTPATIENT)
Dept: OTOLARYNGOLOGY | Facility: CLINIC | Age: 84
End: 2024-05-28
Payer: MEDICARE

## 2024-05-28 VITALS — HEIGHT: 64 IN | WEIGHT: 103 LBS | BODY MASS INDEX: 17.58 KG/M2

## 2024-05-28 DIAGNOSIS — R13.12 OROPHARYNGEAL DYSPHAGIA: ICD-10-CM

## 2024-05-28 DIAGNOSIS — R22.1 NECK SWELLING: Primary | ICD-10-CM

## 2024-05-28 DIAGNOSIS — C01 CANCER OF BASE OF TONGUE (MULTI): ICD-10-CM

## 2024-05-28 DIAGNOSIS — E03.9 ACQUIRED HYPOTHYROIDISM: ICD-10-CM

## 2024-05-28 PROCEDURE — 1157F ADVNC CARE PLAN IN RCRD: CPT | Performed by: OTOLARYNGOLOGY

## 2024-05-28 PROCEDURE — 1036F TOBACCO NON-USER: CPT | Performed by: OTOLARYNGOLOGY

## 2024-05-28 PROCEDURE — 99213 OFFICE O/P EST LOW 20 MIN: CPT | Performed by: OTOLARYNGOLOGY

## 2024-05-28 PROCEDURE — 1159F MED LIST DOCD IN RCRD: CPT | Performed by: OTOLARYNGOLOGY

## 2024-05-28 PROCEDURE — 1160F RVW MEDS BY RX/DR IN RCRD: CPT | Performed by: OTOLARYNGOLOGY

## 2024-05-29 ENCOUNTER — APPOINTMENT (OUTPATIENT)
Dept: PHYSICAL THERAPY | Facility: CLINIC | Age: 84
End: 2024-05-29
Payer: MEDICARE

## 2024-05-29 ENCOUNTER — APPOINTMENT (OUTPATIENT)
Dept: SPEECH THERAPY | Facility: HOSPITAL | Age: 84
End: 2024-05-29
Payer: MEDICARE

## 2024-06-04 ENCOUNTER — APPOINTMENT (OUTPATIENT)
Dept: SPEECH THERAPY | Facility: HOSPITAL | Age: 84
End: 2024-06-04
Payer: MEDICARE

## 2024-06-05 ENCOUNTER — APPOINTMENT (OUTPATIENT)
Dept: SPEECH THERAPY | Facility: HOSPITAL | Age: 84
End: 2024-06-05
Payer: MEDICARE

## 2024-06-06 ENCOUNTER — APPOINTMENT (OUTPATIENT)
Dept: SPEECH THERAPY | Facility: HOSPITAL | Age: 84
End: 2024-06-06
Payer: MEDICARE

## 2024-06-06 ENCOUNTER — APPOINTMENT (OUTPATIENT)
Dept: PHYSICAL THERAPY | Facility: CLINIC | Age: 84
End: 2024-06-06
Payer: MEDICARE

## 2024-06-11 ENCOUNTER — TELEPHONE (OUTPATIENT)
Dept: OTOLARYNGOLOGY | Facility: CLINIC | Age: 84
End: 2024-06-11
Payer: MEDICARE

## 2024-06-11 ENCOUNTER — DOCUMENTATION (OUTPATIENT)
Dept: SPEECH THERAPY | Facility: HOSPITAL | Age: 84
End: 2024-06-11
Payer: MEDICARE

## 2024-06-11 ENCOUNTER — APPOINTMENT (OUTPATIENT)
Dept: SPEECH THERAPY | Facility: HOSPITAL | Age: 84
End: 2024-06-11
Payer: MEDICARE

## 2024-06-11 NOTE — PROGRESS NOTES
"Speech-Language Pathology    Discharge Summary    Name: Yaritza Hua \"Carmen"  MRN: [id number]  : 1940  Date: 24    Discharge Summary: SLP    Discharge Information: Date of discharge 24   Date of last visit 24    Discharge Status: Patient was diagnosed with mandibular infection and requested to hold therapy in May. Upon further CT scans she continues to require antibiotics for the infection and per her report has a small amount of bone loss. At this time she would like to discharge from speech therapy and resume when her infection has cleared.    Rehab Discharge Reason: See above.           "

## 2024-06-11 NOTE — TELEPHONE ENCOUNTER
"Email received at 8:54 a.m. today:    \"Johan Gamboa,    Finally got an answer about compounding medications and whether my prescription plan covers them.  They will if all the ingredients within the compound are covered by them.  Otherwise, they won't cover any of it.        I already have the liquid Vitamin E which I purchased through [employer] and have been taking that for the past week.  I know that the Pentoxifylline will be expensive through that pharmacy in [place] that can compound it into a liquid and will really stretch my budget a lot.  However, I am hoping that taking it will avoid my having surgery in the future. That makes having that happen means more than the cost I might have to endure.    Can you please supply me the name and address of the pharmacy and send a prescription over to them so I can pick it up and get started on taking it?    By the way, today I will be starting the last 10 days of the 30 day prescription for the Augmentin that Dr. Camacho has me on.    Thanks so much.    Jennifer\"    I reviewed with Dr. Camacho who asked that we hold on the [place] until he sees her on 6/25 to reassess and rediscuss the situation.  However, continue Vit E and ATB for now.    In reviewing the chart, the ATB script was written in May for a month supply with 1 refill.  She should not need a new script.    I let Jennifer know the plan in the following email response:    \"Good morning Jennifer--    Dr. Camacho wants to hold off on the pentoxifylline until he sees you in 2 weeks.  There is not guarantee that the medication will heal things and prevent the need for surgery.  Not sure the cost benefit ratio.    He would like you to continue on the antibiotic and vitamin E for now.    Speaking of the antibiotic, you should have a refill on that.  I sent in a 30 day supply on 5/20/24 and put 1 refill on the prescription.    Please check the bottle and/or with your pharmacy as you should have another 30 day supply " "available.    Thanks,    Bee\"      "

## 2024-06-12 ENCOUNTER — APPOINTMENT (OUTPATIENT)
Dept: SPEECH THERAPY | Facility: HOSPITAL | Age: 84
End: 2024-06-12
Payer: MEDICARE

## 2024-06-13 ENCOUNTER — APPOINTMENT (OUTPATIENT)
Dept: SPEECH THERAPY | Facility: HOSPITAL | Age: 84
End: 2024-06-13
Payer: MEDICARE

## 2024-06-13 ENCOUNTER — APPOINTMENT (OUTPATIENT)
Dept: PHYSICAL THERAPY | Facility: CLINIC | Age: 84
End: 2024-06-13
Payer: MEDICARE

## 2024-06-18 ENCOUNTER — APPOINTMENT (OUTPATIENT)
Dept: SPEECH THERAPY | Facility: HOSPITAL | Age: 84
End: 2024-06-18
Payer: MEDICARE

## 2024-06-19 ENCOUNTER — APPOINTMENT (OUTPATIENT)
Dept: SPEECH THERAPY | Facility: HOSPITAL | Age: 84
End: 2024-06-19
Payer: MEDICARE

## 2024-06-20 ENCOUNTER — APPOINTMENT (OUTPATIENT)
Dept: SPEECH THERAPY | Facility: HOSPITAL | Age: 84
End: 2024-06-20
Payer: MEDICARE

## 2024-06-24 NOTE — PROGRESS NOTES
Provider Impressions     Large base of tongue lesion which was treated with a combination of chemotherapy and radiation. There is no evidence of any tumor recurrence.      Status post surgery and radiation therapy for a second primary on the contralateral oral tongue and oropharynx.  Her recent PET scan is negative.  I do not see any evidence of tumor recurrence.     Dysphagia.  She is PEG dependent.  I Did Shorten the Tube Today and Also Provided Her with a new connector.    Hypothyroidism for which she is on Synthroid.     Recent right-sided facial and ear discomfort along with some swelling and pain around the mandible.  This has responded to antibiotics.  She does have some bone exposure on the lingual surface of the mandible on the right.  She is to continue with the Augmentin.  We will cut it down to once a day.I will see her in a month.    Chief Complaint     Follow-up status post surgery for the management of a left oral cavity and oropharyngeal cancer.      History of Present Illness    This lady was seen for the management of a squamous cell carcinoma of the base of tongue. She was diagnosed with a T4 N0 lesion of her base of tongue in February 2018. The tumor was P 16 positive. She was treated with a combination of chemotherapy and radiation and completed her treatments on April 25, 2018. She had a PET scan in March 2019 which was negative. She has had a TSH level in January 2024 which was normal. She is on Synthroid. She had some chest scanning in February 2023 which was negative. She was seen in January 2023 because of a concern about a left tongue lesion. On biopsy this turned out to be consistent with squamous cell carcinoma. She had surgery on March 2, 2023. This was followed by radiation therapy. She has had 2 swallow evaluation which she failed. She remains PEG dependent.  I saw her in May 2024 with some swelling around the right jaw.  She has responded to antibiotics.  I did send her for CT  scanning which really did not show any obvious radionecrosis.  She does have a however some bone exposed on the lingual aspect of the mandible on the right.  She also wants me to look at her PEG tube.    Physical Exam    Examination of the oral cavity shows a lot of changes from the prior surgeries.  I cannot appreciate any worrisome mucosal lesions.  She does have a full dentition.  There is no loose teeth.  She does have a few caps.  She also has a small area of bone exposure on the lingual aspect of the mandible on the right.    The PEG tube was examined.  It is really deteriorating distally.  The tube was eventually shortened and a new connector was provided to the patient.

## 2024-06-25 ENCOUNTER — APPOINTMENT (OUTPATIENT)
Dept: SPEECH THERAPY | Facility: HOSPITAL | Age: 84
End: 2024-06-25
Payer: MEDICARE

## 2024-06-25 ENCOUNTER — APPOINTMENT (OUTPATIENT)
Dept: OTOLARYNGOLOGY | Facility: CLINIC | Age: 84
End: 2024-06-25
Payer: MEDICARE

## 2024-06-25 VITALS — BODY MASS INDEX: 17.75 KG/M2 | WEIGHT: 104 LBS | HEIGHT: 64 IN
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
## 2024-06-25 DIAGNOSIS — M27.2 OSTEORADIONECROSIS OF JAW: Primary | ICD-10-CM

## 2024-06-25 DIAGNOSIS — C01 CANCER OF BASE OF TONGUE (MULTI): ICD-10-CM

## 2024-06-25 DIAGNOSIS — R13.12 OROPHARYNGEAL DYSPHAGIA: ICD-10-CM

## 2024-06-25 DIAGNOSIS — E03.9 ACQUIRED HYPOTHYROIDISM: ICD-10-CM

## 2024-06-25 PROCEDURE — 1159F MED LIST DOCD IN RCRD: CPT | Performed by: OTOLARYNGOLOGY

## 2024-06-25 PROCEDURE — 1036F TOBACCO NON-USER: CPT | Performed by: OTOLARYNGOLOGY

## 2024-06-25 PROCEDURE — 1160F RVW MEDS BY RX/DR IN RCRD: CPT | Performed by: OTOLARYNGOLOGY

## 2024-06-25 PROCEDURE — 99213 OFFICE O/P EST LOW 20 MIN: CPT | Performed by: OTOLARYNGOLOGY

## 2024-06-25 PROCEDURE — 1157F ADVNC CARE PLAN IN RCRD: CPT | Performed by: OTOLARYNGOLOGY

## 2024-06-26 ENCOUNTER — APPOINTMENT (OUTPATIENT)
Dept: SPEECH THERAPY | Facility: HOSPITAL | Age: 84
End: 2024-06-26
Payer: MEDICARE

## 2024-06-27 ENCOUNTER — APPOINTMENT (OUTPATIENT)
Dept: SPEECH THERAPY | Facility: HOSPITAL | Age: 84
End: 2024-06-27
Payer: MEDICARE

## 2024-07-22 NOTE — PROGRESS NOTES
Provider Impressions     Large base of tongue lesion which was treated with a combination of chemotherapy and radiation. There is no evidence of any tumor recurrence.      Status post surgery and radiation therapy for a second primary on the contralateral oral tongue and oropharynx.  Her recent PET scan is negative.  I do not see any evidence of tumor recurrence.     Dysphagia.  She is PEG dependent.      Hypothyroidism for which she is on Synthroid.     Recent right-sided facial and ear discomfort along with some swelling and pain around the mandible.  This has responded to antibiotics.  She does have some bone exposure on the lingual surface of the mandible on the right.  This is much better.  She will finish the prescription in about 22 days.    I will see her in 2 months.    Chief Complaint     Follow-up status post surgery for the management of a left oral cavity and oropharyngeal cancer.      History of Present Illness    This lady was seen for the management of a squamous cell carcinoma of the base of tongue. She was diagnosed with a T4 N0 lesion of her base of tongue in February 2018. The tumor was P 16 positive. She was treated with a combination of chemotherapy and radiation and completed her treatments on April 25, 2018. She had a PET scan in March 2019 which was negative. She has had a TSH level in January 2024 which was normal. She is on Synthroid. She had some chest scanning in February 2023 which was negative. She was seen in January 2023 because of a concern about a left tongue lesion. On biopsy this turned out to be consistent with squamous cell carcinoma. She had surgery on March 2, 2023. This was followed by radiation therapy. She has had 2 swallow evaluation which she failed. She remains PEG dependent.  I saw her in May 2024 with some swelling around the right jaw.  She has responded to antibiotics.  I did send her for CT scanning which really did not show any obvious radionecrosis.  She does  have a however some bone exposed on the lingual aspect of the mandible on the right.  She also wants me to look at her PEG tube.    Physical Exam    Examination of the oral cavity shows a lot of changes from the prior surgeries.  I cannot appreciate any worrisome mucosal lesions.  She does have a full dentition.  There is no loose teeth.  She does have a few caps.  She also has a small area of bone exposure on the lingual aspect of the mandible on the right.  This is much better than previously.  It measures probably 3 mm or so.    The PEG tube was examined.  At her request I reposition the clamp in the other direction.

## 2024-07-23 ENCOUNTER — APPOINTMENT (OUTPATIENT)
Dept: OTOLARYNGOLOGY | Facility: CLINIC | Age: 84
End: 2024-07-23
Payer: MEDICARE

## 2024-07-23 VITALS — BODY MASS INDEX: 17.69 KG/M2 | WEIGHT: 103.6 LBS | HEIGHT: 64 IN

## 2024-07-23 DIAGNOSIS — M27.2 OSTEORADIONECROSIS OF JAW: Primary | ICD-10-CM
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
## 2024-07-23 DIAGNOSIS — E03.9 ACQUIRED HYPOTHYROIDISM: ICD-10-CM

## 2024-07-23 DIAGNOSIS — C01 CANCER OF BASE OF TONGUE (MULTI): ICD-10-CM

## 2024-07-23 DIAGNOSIS — R13.12 OROPHARYNGEAL DYSPHAGIA: ICD-10-CM

## 2024-07-23 PROCEDURE — 1160F RVW MEDS BY RX/DR IN RCRD: CPT | Performed by: OTOLARYNGOLOGY

## 2024-07-23 PROCEDURE — 99213 OFFICE O/P EST LOW 20 MIN: CPT | Performed by: OTOLARYNGOLOGY

## 2024-07-23 PROCEDURE — 1126F AMNT PAIN NOTED NONE PRSNT: CPT | Performed by: OTOLARYNGOLOGY

## 2024-07-23 PROCEDURE — 1157F ADVNC CARE PLAN IN RCRD: CPT | Performed by: OTOLARYNGOLOGY

## 2024-07-23 PROCEDURE — 1036F TOBACCO NON-USER: CPT | Performed by: OTOLARYNGOLOGY

## 2024-07-23 PROCEDURE — 1159F MED LIST DOCD IN RCRD: CPT | Performed by: OTOLARYNGOLOGY

## 2024-07-23 RX ORDER — LUTEIN 6 MG
TABLET ORAL
COMMUNITY

## 2024-07-23 ASSESSMENT — PATIENT HEALTH QUESTIONNAIRE - PHQ9
2. FEELING DOWN, DEPRESSED OR HOPELESS: NOT AT ALL
SUM OF ALL RESPONSES TO PHQ9 QUESTIONS 1 AND 2: 0
1. LITTLE INTEREST OR PLEASURE IN DOING THINGS: NOT AT ALL

## 2024-07-23 ASSESSMENT — PAIN SCALES - GENERAL: PAINLEVEL: 0-NO PAIN

## 2024-07-29 ENCOUNTER — DOCUMENTATION (OUTPATIENT)
Dept: PHYSICAL THERAPY | Facility: CLINIC | Age: 84
End: 2024-07-29
Payer: MEDICARE

## 2024-07-29 NOTE — PROGRESS NOTES
"Physical Therapy    Discharge Summary    Name: Yaritza Hua \"Carmen"  MRN: [id number]  : 1940  Date: 24    Discharge Summary: PT    Discharge Information: Date of discharge 24, Date of last visit 24, and Date of evaluation 24    Therapy Summary: Pt unable to continue with therapy due to further complication with post treatment and infection.     Discharge Status: progression of symptoms due to post infection from  cancer treatment.     Rehab Discharge Reason: Patient requested due to MD  "

## 2024-08-05 ENCOUNTER — TELEPHONE (OUTPATIENT)
Dept: OTOLARYNGOLOGY | Facility: HOSPITAL | Age: 84
End: 2024-08-05
Payer: MEDICARE

## 2024-08-05 DIAGNOSIS — R13.12 OROPHARYNGEAL DYSPHAGIA: ICD-10-CM

## 2024-08-05 DIAGNOSIS — R25.2 TRISMUS: ICD-10-CM

## 2024-08-05 NOTE — TELEPHONE ENCOUNTER
----- Message from Gerardo Camacho sent at 8/2/2024  8:14 AM EDT -----  Regarding: RE: SLP  Go ahead  ----- Message -----  From: CHINO Lehman  Sent: 8/1/2024  11:45 AM EDT  To: Gerardo Camacho MD  Subject: RE: SLP                                          OK to proceed with therapy or OK we should hold off until next apt?  ----- Message -----  From: Gerardo Camacho MD  Sent: 7/31/2024  11:08 AM EDT  To: CHINO Lehman  Subject: RE: SLP                                          ok  ----- Message -----  From: CHINO Lehman  Sent: 7/30/2024   4:27 PM EDT  To: Gerardo Camacho MD  Subject: SLP                                              Felipe--    Jennifer would like a new referral to SLP for trismus therapy.  I wanted to be sure you were okay with this since she has bone exposure.    Let me know if it's okay to proceed or hold off until her next apt with you on 9/24/24.    Thanks,    Bee

## 2024-08-12 ENCOUNTER — EVALUATION (OUTPATIENT)
Dept: SPEECH THERAPY | Facility: HOSPITAL | Age: 84
End: 2024-08-12
Payer: MEDICARE

## 2024-08-12 DIAGNOSIS — R47.1 DYSARTHRIA: Primary | ICD-10-CM

## 2024-08-12 DIAGNOSIS — R13.12 OROPHARYNGEAL DYSPHAGIA: ICD-10-CM

## 2024-08-12 DIAGNOSIS — R25.2 TRISMUS: ICD-10-CM

## 2024-08-12 PROCEDURE — 92526 ORAL FUNCTION THERAPY: CPT | Mod: GN

## 2024-08-12 PROCEDURE — 92610 EVALUATE SWALLOWING FUNCTION: CPT | Mod: GN

## 2024-08-12 ASSESSMENT — PAIN - FUNCTIONAL ASSESSMENT: PAIN_FUNCTIONAL_ASSESSMENT: 0-10

## 2024-08-12 ASSESSMENT — PAIN SCALES - GENERAL: PAINLEVEL_OUTOF10: 0 - NO PAIN

## 2024-08-12 NOTE — PROGRESS NOTES
"Speech-Language Pathology Clinical Swallow Evaluation    Patient Name: Yaritza Hua \"Jennifer\"  MRN: [id number]  : 1940  Today's Date: 24        ASSESSMENT  Impressions:  Marked oral phase and marked suspected pharyngeal phase dysphagia based on clinical swallow evaluation. Pt would benefit from skilled ST to minimize aspiration risk and ensure ongoing safety with the least restrictive diet.    Since last visit patient has been dx with osteoradionecrosis and is taking antibiotics daily. She has not been able to complete exercises due to mandibular pain, however, pain level at 0 now. Thus instructed home exercise program to maintain lingual function (minimal current ROM) to allow for continuation of adequate speech production for daily living independently. Patient does not wish to consume oral intake at this time until further planning has been completed in regards to her ORN.  Trismus: 25mm oral opening, normal range: 35+mm       PLAN  Recommendations:  MBSS recommended: Not at this time; will address oral intake following next appointment with Dr. Camacho per patient request  Solid consistency: NPO  Liquid consistency: NPO  Medication administration: Via PEG tube  Compensatory swallow strategies:    Recommended frequency/duration:  Skilled SLP services recommended: Yes  Frequency: 2x/week however, patient wishes to contact SLP for further appointments following next appt with Dr. Camacho  Duration: 90 days     Strengths: Cognition  Barriers to participation in tx: Severity of symptoms and Comorbidities    Goals:  In 12 weeks...  Pt will complete oral/pharyngeal/laryngeal strengthening exercises as directed given mild  cues from SLP in order to improve swallow function.   GOAL START: 2024    GOAL END: 90 days   Status: Goal initiated this date   Progress this date: instructed CTAR exercises to improve tongue base retraction, jaw and lingual stretches, lingual ROM, anterior neck myofascial release " for home exercises daily.      2.    Patient will reduce trismus to allow for maximum oral opening of 35+mm.   GOAL START: 2024    GOAL END: 90 days   Status: Goal initiated this date   Progress this date: oral openinmm    3.      Pt will complete oral care daily and maintain moisture in oral cavity as able;   GOAL START: 2024    GOAL END: 90 days   Status: Goal initiated this date   Progress this date: reviewed products to use: mineral oil, biotene, lemon hard sucker      4.   Pt will participate in MBSS to determine readiness for a PO diet.   GOAL START: 2024    GOAL END: 90 days   Status: Goal initiated this date   Progress this date: n/a      SUBJECTIVE    PMHx relevant to rehab:   Patient Active Problem List   Diagnosis    Abnormal granulation tissue of abdomen    Acquired hypothyroidism    Actinic keratosis    Adverse effect of radiation    Cancer of base of tongue (Multi)    Carcinoma of oral cavity (Multi)    Oropharyngeal dysphagia    Inflamed seborrheic keratosis    Neoplasm of uncertain behavior of skin    Dysarthria       Chief complaint: Pt reports inability to open oral cavity and swallow    Payor: [facility] MEDICARE / Plan: [facility] MEDICARE / Product Type: *No Product type* /      Pain Assessment  Pain Assessment: 0-10  0-10 (Numeric) Pain Score: 0 - No pain    Pt was alert, pleasant, cooperative, attentive, and engaged for session.  Ability to follow functional commands: WFL  Nutritional status: Has PEG tube             Patient positioning: Upright in chair      OBJECTIVE  Clinical swallow evaluation completed and consisted of interview and oral motor assessment.  ORAL PHASE:  Oral mucosa were pink, moist, and free of obvious lesions. Lingual strength and ROM were minimal. Labial strength/ROM were adequate. Labial seal was adequate. Trismus: 25mm  PHARYNGEAL PHASE: deferred per patient request.      Home Exercise Program:  The following tasks/exercises  were provided to pt/family for home exercise. Training for appropriate completion of tasks/exercises was provided.  instructed CTAR exercises to improve tongue base retraction, jaw and lingual stretches, lingual ROM, anterior neck myofascial release for home exercises daily.    Treatment/Education:  Results and recommendations were relayed to: Patient  Education provided: Yes   Learner: Patient   Barriers to learning: None   Method of teaching: Verbal and Written   Topic: role of ST, results of assessment, risk for aspiration, swallow anatomy/physiology, recommendation for dysphagia follow-up, and recommended communication strategies   Outcome of teaching: Pt/family demonstrated good understanding and teachback/return demonstration  Treatment provided: Yes

## 2024-09-23 NOTE — PROGRESS NOTES
Provider Impressions     Large base of tongue lesion which was treated with a combination of chemotherapy and radiation. There is no evidence of any tumor recurrence.  A chest x-ray will be obtained today.     Status post surgery and radiation therapy for a second primary on the contralateral oral tongue and oropharynx.  Her recent PET scan is negative.  I do not see any evidence of tumor recurrence.     Dysphagia.  She is PEG dependent.      Hypothyroidism for which she is on Synthroid.     Recent right-sided facial and ear discomfort along with some swelling and pain around the mandible.  This has responded to antibiotics.  She does have some bone exposure on the lingual surface of the mandible on the right.  This is much better.  She is no longer on antibiotics.    I will see her in 3 months.    Chief Complaint     Follow-up status post surgery for the management of a left oral cavity and oropharyngeal cancer.      History of Present Illness    This lady was seen for the management of a squamous cell carcinoma of the base of tongue. She was diagnosed with a T4 N0 lesion of her base of tongue in February 2018. The tumor was P 16 positive. She was treated with a combination of chemotherapy and radiation and completed her treatments on April 25, 2018. She had a PET scan in March 2019 which was negative. She has had a TSH level in September 2024 which was normal. She is on Synthroid. She had some chest scanning in February 2023 which was negative. She was seen in January 2023 because of a concern about a left tongue lesion. On biopsy this turned out to be consistent with squamous cell carcinoma. She had surgery on March 2, 2023. This was followed by radiation therapy. She has had 2 swallow evaluation which she failed. She remains PEG dependent.  I saw her in May 2024 with some swelling around the right jaw.  She has responded to antibiotics.  She has not had any antibiotics for at least a month.  I did send her for  CT scanning which really did not show any obvious radionecrosis.  She does have a however some bone exposed on the lingual aspect of the mandible on the right.      Physical Exam    Examination of the oral cavity shows a lot of changes from the prior surgeries.  I cannot appreciate any worrisome mucosal lesions.  She does have a full dentition.  There is no loose teeth.  She does have a few caps.  She also has a small area of bone exposure on the lingual aspect of the mandible on the right.  This is much better than previously.  It measures probably 3 mm or so.    A flexible laryngoscopy was carried out. Under topical Xylocaine and Darío-Synephrine the scope was introduced through the nostril. The nasopharynx, base of tongue, hypopharynx, and larynx are visualized.  The vocal cords are normally mobile. There is no pooling of secretions in the piriform sinuses. There is no evidence of any mucosal lesions.

## 2024-09-24 ENCOUNTER — HOSPITAL ENCOUNTER (OUTPATIENT)
Dept: RADIOLOGY | Facility: CLINIC | Age: 84
Discharge: HOME | End: 2024-09-24
Payer: MEDICARE

## 2024-09-24 ENCOUNTER — APPOINTMENT (OUTPATIENT)
Dept: OTOLARYNGOLOGY | Facility: CLINIC | Age: 84
End: 2024-09-24
Payer: MEDICARE

## 2024-09-24 VITALS — HEIGHT: 63 IN | WEIGHT: 109 LBS | BODY MASS INDEX: 19.31 KG/M2

## 2024-09-24 DIAGNOSIS — R13.12 OROPHARYNGEAL DYSPHAGIA: Primary | ICD-10-CM

## 2024-09-24 DIAGNOSIS — M27.2 OSTEORADIONECROSIS OF JAW: ICD-10-CM
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
## 2024-09-24 DIAGNOSIS — C01 CANCER OF BASE OF TONGUE (MULTI): ICD-10-CM

## 2024-09-24 DIAGNOSIS — E03.9 ACQUIRED HYPOTHYROIDISM: ICD-10-CM

## 2024-09-24 DIAGNOSIS — R13.12 OROPHARYNGEAL DYSPHAGIA: ICD-10-CM

## 2024-09-24 PROCEDURE — 71046 X-RAY EXAM CHEST 2 VIEWS: CPT

## 2024-09-24 PROCEDURE — 99213 OFFICE O/P EST LOW 20 MIN: CPT | Performed by: OTOLARYNGOLOGY

## 2024-09-24 PROCEDURE — 71046 X-RAY EXAM CHEST 2 VIEWS: CPT | Performed by: RADIOLOGY

## 2024-09-24 PROCEDURE — 1157F ADVNC CARE PLAN IN RCRD: CPT | Performed by: OTOLARYNGOLOGY

## 2024-09-24 PROCEDURE — 1160F RVW MEDS BY RX/DR IN RCRD: CPT | Performed by: OTOLARYNGOLOGY

## 2024-09-24 PROCEDURE — 31575 DIAGNOSTIC LARYNGOSCOPY: CPT | Performed by: OTOLARYNGOLOGY

## 2024-09-24 PROCEDURE — 1159F MED LIST DOCD IN RCRD: CPT | Performed by: OTOLARYNGOLOGY

## 2024-09-24 PROCEDURE — 1036F TOBACCO NON-USER: CPT | Performed by: OTOLARYNGOLOGY

## 2024-09-25 ENCOUNTER — TELEPHONE (OUTPATIENT)
Dept: OTOLARYNGOLOGY | Facility: CLINIC | Age: 84
End: 2024-09-25
Payer: MEDICARE

## 2024-09-25 DIAGNOSIS — R93.89 ABNORMAL CHEST X-RAY: ICD-10-CM

## 2024-09-25 DIAGNOSIS — C06.9: ICD-10-CM

## 2024-09-25 NOTE — TELEPHONE ENCOUNTER
----- Message from Gerardo Camacho sent at 9/25/2024  4:11 PM EDT -----  I spoke with her.  Please get a CT scan of her chest because of the abnormal chest x-ray  ----- Message -----  From: Interface, Radiology Results In  Sent: 9/25/2024   3:17 PM EDT  To: Gerardo Camacho MD

## 2024-09-25 NOTE — TELEPHONE ENCOUNTER
I called Jennifer and we arranged her CT chest at  [facility] on 10/10/24  at 10:30 a.m. as she is going to [place] to visit her daughter.    Apt information mailed to her.

## 2024-10-02 DIAGNOSIS — R93.89 ABNORMAL CHEST X-RAY: ICD-10-CM

## 2024-10-07 ENCOUNTER — APPOINTMENT (OUTPATIENT)
Dept: RADIOLOGY | Facility: CLINIC | Age: 84
End: 2024-10-07
Payer: MEDICARE

## 2024-10-07 ENCOUNTER — TELEPHONE (OUTPATIENT)
Dept: OTOLARYNGOLOGY | Facility: HOSPITAL | Age: 84
End: 2024-10-07
Payer: MEDICARE

## 2024-10-07 NOTE — TELEPHONE ENCOUNTER
"Email received at 11:13 a.m.:    \"Good morning Bee,    Thought I better let you know what went down with my CT scan today.    Went for the scan to [facility] but was told I could not get it.  The gal who I spoke with, Liya, told me that the scan had been approved for the 10th.  But because it was rescheduled for the 7th, they didn't have approval for today's date from my health insurance.  I had to reschedule it to Wednesday, the 9th.    I don't understand this whole situation and why I couldn't have it done today.  I don't see why rescheduling it for an earlier date made a difference.  But Liya said there was nothing she could do without approval.      So, will it be approved for the 9th or will I not be able to have it even done?    Thanks Bee.\"    With the help of Prisca Holman, ENT financial counselor, we emailed [facility] and received the following response back at noon:    \"Hello,    This patient just hit our WQ on Friday and is scheduled for 10/09. We were unaware that there is an authorization on file and we will update the referral now. The authorization number is [id number] and is valid 09/25-11/09/2024.    Thanks    Josie Richards  Supervisor [facility]\"    I emailed Jennifer back at 12:35 p.m.:    \"Good afternoon Jennifer--    [facility] could have done the scan today.  I'm not sure where they look for their information to find out if a person's imaging has insurance authorization or not.  After receiving your email, we reached out to [employer].  Based on the response, it seems there was just a delay in communication.  The following is the response we received:    “We were unaware that there is an authorization on file and we will update the referral now. The authorization number is [id number] and is valid 09/25-11/09/2024.”    There should be no issues with you getting the scan done on 10/9.    I'm sorry for the inconvenience.  While we try to " "avoid this, sometimes it does happen when apt dates get moved around.      Thanks,    Bee Downey, MSN, RN, ACNS-BC, CORLN  Clinical Nurse Specialist  Certified Otorhinolaryngology Nurse\"          "

## 2024-10-09 ENCOUNTER — HOSPITAL ENCOUNTER (OUTPATIENT)
Dept: RADIOLOGY | Facility: CLINIC | Age: 84
Discharge: HOME | End: 2024-10-09
Payer: MEDICARE

## 2024-10-09 DIAGNOSIS — R93.89 ABNORMAL CHEST X-RAY: ICD-10-CM

## 2024-10-09 PROCEDURE — 71250 CT THORAX DX C-: CPT

## 2024-10-10 ENCOUNTER — APPOINTMENT (OUTPATIENT)
Dept: RADIOLOGY | Facility: CLINIC | Age: 84
End: 2024-10-10
Payer: MEDICARE

## 2024-10-14 ENCOUNTER — TELEPHONE (OUTPATIENT)
Dept: OTOLARYNGOLOGY | Facility: HOSPITAL | Age: 84
End: 2024-10-14
Payer: MEDICARE

## 2024-10-14 NOTE — TELEPHONE ENCOUNTER
This patient was called regarding the CT scan of her chest which was done because of an abnormal chest x-ray.  There is no evidence of any metastatic disease.  They recommend that the CT be repeated in 3 months.  This was discussed with the patient.

## 2024-10-28 ENCOUNTER — APPOINTMENT (OUTPATIENT)
Dept: DERMATOLOGY | Facility: CLINIC | Age: 84
End: 2024-10-28
Payer: MEDICARE

## 2024-10-28 DIAGNOSIS — L82.1 SEBORRHEIC KERATOSIS: ICD-10-CM

## 2024-10-28 DIAGNOSIS — D22.9 NEVUS: Primary | ICD-10-CM

## 2024-10-28 DIAGNOSIS — Z85.828 HISTORY OF NONMELANOMA SKIN CANCER: ICD-10-CM

## 2024-10-28 DIAGNOSIS — L81.4 LENTIGO: ICD-10-CM

## 2024-10-28 DIAGNOSIS — L30.9 DERMATITIS: ICD-10-CM

## 2024-10-28 DIAGNOSIS — Z12.83 SCREENING EXAM FOR SKIN CANCER: ICD-10-CM

## 2024-10-28 PROCEDURE — 1159F MED LIST DOCD IN RCRD: CPT | Performed by: NURSE PRACTITIONER

## 2024-10-28 PROCEDURE — 99213 OFFICE O/P EST LOW 20 MIN: CPT | Performed by: NURSE PRACTITIONER

## 2024-10-28 PROCEDURE — 1157F ADVNC CARE PLAN IN RCRD: CPT | Performed by: NURSE PRACTITIONER

## 2024-10-28 PROCEDURE — 1036F TOBACCO NON-USER: CPT | Performed by: NURSE PRACTITIONER

## 2024-10-28 RX ORDER — TRIAMCINOLONE ACETONIDE 1 MG/G
CREAM TOPICAL 2 TIMES DAILY PRN
Qty: 80 G | Refills: 5 | Status: SHIPPED | OUTPATIENT
Start: 2024-10-28

## 2024-11-12 ENCOUNTER — OFFICE VISIT (OUTPATIENT)
Facility: CLINIC | Age: 84
End: 2024-11-12
Payer: MEDICARE

## 2024-11-12 VITALS — HEIGHT: 63 IN | WEIGHT: 109 LBS | BODY MASS INDEX: 19.31 KG/M2

## 2024-11-12 DIAGNOSIS — L03.211 FACIAL CELLULITIS: ICD-10-CM
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
## 2024-11-12 DIAGNOSIS — C06.9: Primary | ICD-10-CM

## 2024-11-12 DIAGNOSIS — M27.2 OSTEORADIONECROSIS OF JAW: ICD-10-CM

## 2024-11-12 DIAGNOSIS — R13.12 OROPHARYNGEAL DYSPHAGIA: ICD-10-CM

## 2024-11-12 DIAGNOSIS — C01 CANCER OF BASE OF TONGUE (MULTI): ICD-10-CM

## 2024-11-12 DIAGNOSIS — E03.9 ACQUIRED HYPOTHYROIDISM: ICD-10-CM

## 2024-11-12 PROCEDURE — 1036F TOBACCO NON-USER: CPT | Performed by: OTOLARYNGOLOGY

## 2024-11-12 PROCEDURE — 1160F RVW MEDS BY RX/DR IN RCRD: CPT | Performed by: OTOLARYNGOLOGY

## 2024-11-12 PROCEDURE — 1157F ADVNC CARE PLAN IN RCRD: CPT | Performed by: OTOLARYNGOLOGY

## 2024-11-12 PROCEDURE — 99213 OFFICE O/P EST LOW 20 MIN: CPT | Performed by: OTOLARYNGOLOGY

## 2024-11-12 PROCEDURE — 1159F MED LIST DOCD IN RCRD: CPT | Performed by: OTOLARYNGOLOGY

## 2024-11-12 RX ORDER — AMOXICILLIN AND CLAVULANATE POTASSIUM 875; 125 MG/1; MG/1
TABLET, FILM COATED ORAL
Qty: 42 TABLET | Refills: 0 | Status: SHIPPED | OUTPATIENT
Start: 2024-11-12 | End: 2024-12-10

## 2024-11-12 NOTE — PROGRESS NOTES
Provider Impressions     Large base of tongue lesion which was treated with a combination of chemotherapy and radiation. There is no evidence of any tumor recurrence.       Status post surgery and radiation therapy for a second primary on the contralateral oral tongue and oropharynx.  Her recent PET scan is negative.  I do not see any evidence of tumor recurrence.     Dysphagia.  She is PEG dependent.      Hypothyroidism for which she is on Synthroid.     Recent issues with the right side of her mandible with some bone exposure.  She had responded to antibiotics but now she seems to have a recurrence of the infectious problem.  She is put back on Augmentin.  She understands that she needs to call me back if things get worse and she will be admitted.    I will see her at her regular appointment.    Chief Complaint     Follow-up status post surgery for the management of a left oral cavity and oropharyngeal cancer.      History of Present Illness    This lady was seen for the management of a squamous cell carcinoma of the base of tongue. She was diagnosed with a T4 N0 lesion of her base of tongue in February 2018. The tumor was P 16 positive. She was treated with a combination of chemotherapy and radiation and completed her treatments on April 25, 2018. She had a PET scan in March 2019 which was negative. She has had a TSH level in September 2024 which was normal. She is on Synthroid. She had some chest scanning in February 2023 which was negative. She was seen in January 2023 because of a concern about a left tongue lesion. On biopsy this turned out to be consistent with squamous cell carcinoma. She had surgery on March 2, 2023. This was followed by radiation therapy. She has had 2 swallow evaluation which she failed. She remains PEG dependent.  I saw her in May 2024 with some swelling around the right jaw.  She has responded to antibiotics.  They were eventually stopped.  I did send her for CT scanning which really  did not show any obvious radionecrosis.  She does have a however some bone exposed on the lingual aspect of the mandible on the right.  When she saw my dental colleague recently she had a piece of bone taken out from that area.  She was fine until yesterday when suddenly she started having blood in her mouth and also some redness in her face.    Physical Exam    Examination of the oral cavity shows a lot of changes from the prior surgeries.  I cannot appreciate any worrisome mucosal lesions.  She does have a full dentition.  There is no loose teeth.  She does have a few caps.  She also has a small area of bone exposure on the lingual aspect of the mandible on the right.  Pressure on the tissue around the area shows some material that is expressed from that area.

## 2025-01-10 DIAGNOSIS — E03.9 ACQUIRED HYPOTHYROIDISM: ICD-10-CM

## 2025-01-10 RX ORDER — LEVOTHYROXINE SODIUM 50 UG/1
TABLET ORAL
Qty: 90 TABLET | Refills: 2 | Status: SHIPPED | OUTPATIENT
Start: 2025-01-10

## 2025-01-10 NOTE — TELEPHONE ENCOUNTER
Chart reviewed.  Last TSH was September 2024 and WNL.  Prescription refill pended for Dr. Camacho's authorization.

## 2025-01-13 ENCOUNTER — TELEPHONE (OUTPATIENT)
Dept: OTOLARYNGOLOGY | Facility: HOSPITAL | Age: 85
End: 2025-01-13
Payer: MEDICARE

## 2025-01-13 NOTE — TELEPHONE ENCOUNTER
"Email received on Saturday, January 11th at 10:55 a.m.:    \"Good morning Bee,    I realize you won't see this until Monday morning, but I have a question before I come in on Tuesday to see Dr. Camacho.    I received an email on Friday that there was a new message for me in my chart.  The message was for 2 orders.   One for a creatinine, serum and one for a CT chest wo IV contrast.  I printed them out and wanted to know if it's okay to get them done on Tuesday when I come in for my appointment?    Hope you had a good weekend.    Jennifer      My response this morning at 8:15 a.m.:    \"Good morning Jennifer--    Upon reviewing your chart, you have no outstanding testing.    The lab order (creatinine) was ordered back in May, 2024 for the CT neck you had done.  You had that blood work done at an outside facility.    The CT chest is without contrast so you wouldn't even need the blood work for that.  Regarding the CT chest, you completed that back in October 2024.    Thanks,    Bee Downey, MSN, RN, ACNS-BC, CORLN  Clinical Nurse Specialist  Certified Otorhinolaryngology Nurse  [phone]/fax [phone]\"    "

## 2025-01-13 NOTE — PROGRESS NOTES
Provider Impressions     Large base of tongue lesion which was treated with a combination of chemotherapy and radiation. There is no evidence of any tumor recurrence.       Status post surgery and radiation therapy for a second primary on the contralateral oral tongue and oropharynx.  I do not see any evidence of tumor recurrence.     Dysphagia.  She is PEG dependent.      Hypothyroidism for which she is on Synthroid.     Recent issues with the right side of her mandible with some bone exposure.  She had responded to antibiotics.  She has not had any issues since I last saw her.  He is to continue the vitamin E.    Abnormal CT scan of her chest back in October.  The repeat CT will be obtained.    I will see her in 3 months.    Chief Complaint     Follow-up status post surgery for the management of a left oral cavity and oropharyngeal cancer.      History of Present Illness    This lady was seen for the management of a squamous cell carcinoma of the base of tongue. She was diagnosed with a T4 N0 lesion of her base of tongue in February 2018. The tumor was P 16 positive. She was treated with a combination of chemotherapy and radiation and completed her treatments on April 25, 2018. She had a PET scan in March 2019 which was negative. She has had a TSH level in September 2024 which was normal. She is on Synthroid. She had some chest scanning in October 2024 which was not completely normal and a repeat scan is suggested.. She was seen in January 2023 because of a concern about a left tongue lesion. On biopsy this turned out to be consistent with squamous cell carcinoma. She had surgery on March 2, 2023. This was followed by radiation therapy. She has had 2 swallow evaluation which she failed. She remains PEG dependent.  I saw her in May 2024 with some swelling around the right jaw.  She has responded to antibiotics.  They were eventually stopped.  I did send her for CT scanning which really did not show any obvious  radionecrosis.  She does have some bone exposed on the lingual aspect of the mandible on the right.  When she saw my dental colleague recently she had a piece of bone taken out from that area.  She has not had any recent issues.  She continues the vitamin E.  She cannot be on Trental because we cannot give it through the PEG.    Physical Exam    Examination of the oral cavity shows a lot of changes from the prior surgeries.  I cannot appreciate any worrisome mucosal lesions.  She does have a full dentition.  There is no loose teeth.  She does have a few caps.  She also has a small area of bone exposure on the lingual aspect of the mandible on the right.  It does not look infected.  Palpation of the parotid, neck, thyroid feel fails to show any worrisome masses or adenopathies.    A flexible laryngoscopy was carried out. Under topical Xylocaine and Darío-Synephrine the scope was introduced through the nostril. The nasopharynx, base of tongue, hypopharynx, and larynx are visualized.  The vocal cords are normally mobile. There is no pooling of secretions in the piriform sinuses. There is no evidence of any mucosal lesions.

## 2025-01-14 ENCOUNTER — APPOINTMENT (OUTPATIENT)
Dept: OTOLARYNGOLOGY | Facility: CLINIC | Age: 85
End: 2025-01-14
Payer: MEDICARE

## 2025-01-14 VITALS — HEIGHT: 63 IN | WEIGHT: 108 LBS | BODY MASS INDEX: 19.14 KG/M2

## 2025-01-14 DIAGNOSIS — E03.9 ACQUIRED HYPOTHYROIDISM: ICD-10-CM
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
## 2025-01-14 DIAGNOSIS — R93.89 ABNORMAL CT OF THE CHEST: ICD-10-CM

## 2025-01-14 DIAGNOSIS — M27.2 OSTEORADIONECROSIS OF JAW: ICD-10-CM

## 2025-01-14 DIAGNOSIS — C06.9: ICD-10-CM

## 2025-01-14 DIAGNOSIS — C01 CANCER OF BASE OF TONGUE (MULTI): Primary | ICD-10-CM

## 2025-01-14 PROCEDURE — 1036F TOBACCO NON-USER: CPT | Performed by: OTOLARYNGOLOGY

## 2025-01-14 PROCEDURE — 1157F ADVNC CARE PLAN IN RCRD: CPT | Performed by: OTOLARYNGOLOGY

## 2025-01-14 PROCEDURE — 1159F MED LIST DOCD IN RCRD: CPT | Performed by: OTOLARYNGOLOGY

## 2025-01-14 PROCEDURE — 1160F RVW MEDS BY RX/DR IN RCRD: CPT | Performed by: OTOLARYNGOLOGY

## 2025-01-14 PROCEDURE — 31575 DIAGNOSTIC LARYNGOSCOPY: CPT | Performed by: OTOLARYNGOLOGY

## 2025-01-14 PROCEDURE — 99213 OFFICE O/P EST LOW 20 MIN: CPT | Performed by: OTOLARYNGOLOGY

## 2025-02-03 ENCOUNTER — HOSPITAL ENCOUNTER (OUTPATIENT)
Dept: RADIOLOGY | Facility: CLINIC | Age: 85
Discharge: HOME | End: 2025-02-03
Payer: MEDICARE

## 2025-02-03 DIAGNOSIS — C06.9: ICD-10-CM

## 2025-02-03 DIAGNOSIS — R93.89 ABNORMAL CT OF THE CHEST: ICD-10-CM

## 2025-02-03 PROCEDURE — 71250 CT THORAX DX C-: CPT | Performed by: RADIOLOGY

## 2025-02-03 PROCEDURE — 71250 CT THORAX DX C-: CPT

## 2025-02-25 ENCOUNTER — TELEPHONE (OUTPATIENT)
Facility: CLINIC | Age: 85
End: 2025-02-25
Payer: MEDICARE

## 2025-02-25 NOTE — TELEPHONE ENCOUNTER
"Jennifer sent me the following email 2/24/25 @ 3:28 p.m.:    \"Good afternoon Bee..........    It sure is nice to have the warmer temperature today.  Almost is balmy out compared to what it has been.    I have a couple issues going on and don't know what to do about either of them.    The first is that in the past couple weeks, I have had so many secretions that form in my mouth, they run down my chin.  I am constantly having to rub my chin to dry it off so much so that I've made it red and sore to the touch.  But since Saturday morning, I have the chipmunk look going on only on the left side of my face, but it doesn't stay.  It was swollen when I got up Saturday morning but by the middle of the day, the swelling was gone.  Same happened on Sunday.  This morning, I had no swelling upon awakening but when I got up from about a two hour nap, it was swollen again.  I have no idea what's going on since it isn't staying but comes and goes.  Seems to always happen after I've been laying.    My other issue is my PEG tube.  Is there a shelf life as to how long it lasts?  The last few weeks now, I've had to dilute the Isosource with water while it's in the syringe, to get it to move down the syringe and through the tubing.  I've been flushing it out every couple of days, but that doesn't seem to improve the flow of the Isosource.  Also, inside the portion of the top piece that closes off the tube, it is getting some black gunk in it that almost looks like mildew that forms in a shower.  Flushing the syringe doesn't dislodge it.  Here again, I don't know what's happening that the flow isn't like it was and why the blackish gunk has built up in that top piece.    Do you think that Dr. Camacho would want to check out both of these issues?  And, if so, would it be possible to get an appointment to see him in [place]?  My kids just came down with a horrible case of the flu so I wouldn't have the arability to get to the main " "[place].      Thanks so much Bee.  Hope you have a great rest of your day.    Jennifer\"    I emailed her back at 3:39 p.m.:    \"Johan Rodriguez--     You should be flushing your tube with water after every feeding, not “I've been flushing it out every couple of days.”  You can try putting some club soda or something with bubbles (carbonation) in the tube.  Let it sit in the tube for 3-5 minutes and then flush it with warm water to see if that helps with the flow.  It's not uncommon for the tube to become discolored in time, and yes, it can look black in color.  Sorry but any issues with the feeding tube would have to be seen and managed at [facility], not [place].     The on and off swelling sounds like lymph edema and it occurs when you lay more flat.  Best suggestion is to sleep a bit elevated (approx. 30-45 degree head of bed elevation).  A wedge pillow can be helpful.     I have no explanation for the secretions, other than to suggest you not rub hard on your chin when you need to wipe it off.       Happy to see you, but with the peg issues, you'd have to be seen at [facility] so that all issues can be addressed in one visit.     Thanks,     Bee\"    She messaged back at 4:04 p.m.:    \"When I said I flush it every couple days, I meant that I push the plunger into the syringe and force the water down through the tubing.  Otherwise, I'm putting warm water in the tube daily both before and after each feeding. I understand about the PEG tube having to be done at [facility].  I see Dr. Camacho in April, so I can talk to him about it then to see what he recommends on doing with it.    Going to try the carbonated idea.  Wonder if beer would work..........LOL.  Thanks for the tip.    Thank for your insight about the swelling in my face.  do you think I need to have lymphedema therapy again?  I had it before at [facility] and it seemed to help.  But I'll be sleeping on 2 pillows each night now to prop my head up " "better so maybe the swelling won't be as bad.    Thanks, \"Mom\", I'll try and take it easy on my chin.  enjoy the warmer temperatures for the next couple days.    Jennifer\"    I sent her a message this morning at 11:35 a.m. just checking on her/any updates on how things are doing.    Jennifer did message me back at 12:34 p.m.:    \"Used club soda as you suggested. Tube flowing much better  Secretions the same. Trying not to rub my chin too hard. I'm presently waiting for the show to. open to see Prabha:the [employer] Mik[employer]. I'm  a big fan of Laur[employer] animated movies. Figured I deserved a day out  Thanks for checking up on me. Have a great day.  Jennifer\"              "

## 2025-03-04 ENCOUNTER — APPOINTMENT (OUTPATIENT)
Dept: DERMATOLOGY | Facility: CLINIC | Age: 85
End: 2025-03-04
Payer: MEDICARE

## 2025-03-04 DIAGNOSIS — R21 RASH AND OTHER NONSPECIFIC SKIN ERUPTION: Primary | ICD-10-CM

## 2025-03-04 PROCEDURE — 99214 OFFICE O/P EST MOD 30 MIN: CPT | Performed by: NURSE PRACTITIONER

## 2025-03-04 PROCEDURE — 1036F TOBACCO NON-USER: CPT | Performed by: NURSE PRACTITIONER

## 2025-03-04 PROCEDURE — 1157F ADVNC CARE PLAN IN RCRD: CPT | Performed by: NURSE PRACTITIONER

## 2025-03-04 PROCEDURE — 1159F MED LIST DOCD IN RCRD: CPT | Performed by: NURSE PRACTITIONER

## 2025-03-04 RX ORDER — DOXYCYCLINE HYCLATE 100 MG
100 TABLET ORAL 2 TIMES DAILY
Qty: 20 TABLET | Refills: 0 | Status: SHIPPED | OUTPATIENT
Start: 2025-03-04 | End: 2025-03-14

## 2025-03-04 RX ORDER — HYDROCORTISONE 25 MG/G
CREAM TOPICAL 2 TIMES DAILY
Qty: 120 G | Refills: 3 | Status: SHIPPED | OUTPATIENT
Start: 2025-03-04 | End: 2025-03-18

## 2025-03-04 NOTE — PROGRESS NOTES
Subjective     Jennifer Hua is a 84 y.o. female who presents for the following: Rash.   Established patient in for rash present to lower jaw line that began last week patient states it is swelling, red, flaking, one are patient states was raw and has since healed over. Patient has been using Aquaphor.     Review of Systems:  No other skin or systemic complaints other than what is documented elsewhere in the note.    The following portions of the chart were reviewed this encounter and updated as appropriate:       Skin Cancer History  No skin cancer on file.    Specialty Problems          Dermatology Problems    Actinic keratosis    Inflamed seborrheic keratosis    Neoplasm of uncertain behavior of skin    Abnormal granulation tissue of abdomen     Past Medical History:  Jennifer Hua  has a past medical history of Personal history of malignant neoplasm, unspecified, Personal history of other diseases of the circulatory system, Personal history of other diseases of the nervous system and sense organs, Personal history of other endocrine, nutritional and metabolic disease, and Personal history of other endocrine, nutritional and metabolic disease.    Past Surgical History:  Jennifer Hua  has a past surgical history that includes Rotator cuff repair (2018); Appendectomy (2018);  section, classic (2018); Hernia repair (2018); Tonsillectomy (2018); Foot surgery (2018); Back surgery (2018); and Hand surgery (2018).    Family History:  Patient family history includes Allergies in an other family member.    Social History:  Jennifer Hua  reports that she quit smoking about 41 years ago. Her smoking use included cigarettes. She has never used smokeless tobacco. No history on file for alcohol use and drug use.    Allergies:  Ace inhibitors, Carbamazepine, Shellfish containing products, and Simvastatin    Current Medications / CAM's:    Current Outpatient  Medications:     atorvastatin (Lipitor) 40 mg tablet, Take by mouth., Disp: , Rfl:     Betasept Surgical Scrub 4 % external liquid, USE AS DIRECTED. (Patient not taking: Reported on 1/14/2025), Disp: , Rfl:     chlorhexidine (Peridex) 0.12 % solution, Use in the mouth or throat 4 times a day. (Patient not taking: Reported on 1/14/2025), Disp: , Rfl:     doxycycline (Vibra-Tabs) 100 mg tablet, Take 1 tablet (100 mg) by mouth 2 times a day for 10 days. Take with a full glass of water and do not lie down for at least 30 minutes after., Disp: 20 tablet, Rfl: 0    enoxaparin (Lovenox) 40 mg/0.4 mL syringe, , Disp: , Rfl:     enoxaparin (Lovenox) 60 mg/0.6 mL syringe, Inject 0.5 mL (50 mg) under the skin every 12 hours. (Patient not taking: Reported on 1/14/2025), Disp: , Rfl:     hydrocortisone 2.5 % cream, Apply topically 2 times a day for 14 days., Disp: 120 g, Rfl: 3    latanoprost (Xalatan) 0.005 % ophthalmic solution, Administer into affected eye(s)., Disp: , Rfl:     levothyroxine (Synthroid, Levoxyl) 50 mcg tablet, TAKE 1 TABLET DAILY IN THE MORNING BEFORE A MEAL ON AN EMPTY STOMACH WITH WATER ONLY & WAIT 1 HOUR BEFORE TAKING FOOD OR OTHER MEDICATIONS, Disp: 90 tablet, Rfl: 2    nystatin (Mycostatin) 100,000 unit/mL suspension, Take 2 mL (200,000 Units) by mouth 4 times a day. (Patient not taking: Reported on 1/14/2025), Disp: , Rfl:     oxyCODONE (Roxicodone) 5 mg immediate release tablet, Take 1 tablet (5 mg) by mouth every 4 hours if needed. (Patient not taking: Reported on 1/14/2025), Disp: , Rfl:     oxyCODONE (Roxicodone) 5 mg/5 mL solution, , Disp: , Rfl:     PreviDent 1.1 % gel, USE ONE TIME PER DAY IN FLUORIDE CARRIERS  FOR 10 MINUTES, Disp: , Rfl:     sodium chloride 0.9 % irrigation solution, FOR USE WITH TRACHEOSTOMY CARE AND SUCTIONING THREE TIMES A DAY AND AS NEEDED (Patient not taking: Reported on 1/14/2025), Disp: , Rfl:     triamcinolone (Kenalog) 0.1 % cream, Apply topically 2 times a day as  "needed for rash. (Patient not taking: Reported on 1/14/2025), Disp: 80 g, Rfl: 5    vitamin E 1,150 unit/1.25 mL liquid, Take by mouth., Disp: , Rfl:     warfarin (Coumadin) 5 mg tablet, Take by mouth., Disp: , Rfl:      Objective   Well appearing patient in no apparent distress; mood and affect are within normal limits.      Assessment/Plan   1. Rash and other nonspecific skin eruption  Anterior Mid Neck, Left Anterior Mandible, Left Anterior Neck, Left Buccal Cheek, Left Chin, Left Lower Cutaneous Lip, Mid Chin, Mid Lower Cutaneous Lip, Right Anterior Mandible, Right Anterior Neck, Right Buccal Cheek, Right Chin, Right Lower Cutaneous Lip  Last radiation treatment was in 2023 from SCC of the tongue.    She noticed 5 days ago erythema, scaling, and additional edema to lower chin, jawline and neck anteriorly.  Patient consulted with ENT who states this is likely due to lymphedema.  She states the rash is itchy and while she attempts to avoid scratching she can make it bleed.  On exam today looks like eczematous scaling on an erythematous poorly demarcated pink patch.  Palpation to lower jawline and neck results in firm palpation.  She states it is not painful with the exception and deep palpation under her chin.  Patient states she has no difficulty breathing complains of no shortness of breath and states that her neck is firm at baseline but this seems to be more \"swollen\"    She denies new products and states she believes that this happened twice last year.  Treated by primary with 2 separate courses of antibiotics amoxicillin for 90 days and an unknown antibiotic for 30 days.                          Related Procedures  Follow Up In Dermatology - Established Patient    Related Medications  doxycycline (Vibra-Tabs) 100 mg tablet  Take 1 tablet (100 mg) by mouth 2 times a day for 10 days. Take with a full glass of water and do not lie down for at least 30 minutes after.    hydrocortisone 2.5 % cream  Apply " topically 2 times a day for 14 days.

## 2025-03-11 ENCOUNTER — APPOINTMENT (OUTPATIENT)
Dept: DERMATOLOGY | Facility: CLINIC | Age: 85
End: 2025-03-11
Payer: MEDICARE

## 2025-03-11 ENCOUNTER — TELEPHONE (OUTPATIENT)
Facility: CLINIC | Age: 85
End: 2025-03-11

## 2025-03-11 DIAGNOSIS — R21 RASH AND OTHER NONSPECIFIC SKIN ERUPTION: Primary | ICD-10-CM

## 2025-03-11 PROCEDURE — 1036F TOBACCO NON-USER: CPT | Performed by: NURSE PRACTITIONER

## 2025-03-11 PROCEDURE — 1159F MED LIST DOCD IN RCRD: CPT | Performed by: NURSE PRACTITIONER

## 2025-03-11 PROCEDURE — 99213 OFFICE O/P EST LOW 20 MIN: CPT | Performed by: NURSE PRACTITIONER

## 2025-03-11 PROCEDURE — 1157F ADVNC CARE PLAN IN RCRD: CPT | Performed by: NURSE PRACTITIONER

## 2025-03-11 RX ORDER — DOXYCYCLINE HYCLATE 100 MG
100 TABLET ORAL 2 TIMES DAILY
Qty: 28 TABLET | Refills: 0 | Status: SHIPPED | OUTPATIENT
Start: 2025-03-11 | End: 2025-03-25

## 2025-03-11 NOTE — TELEPHONE ENCOUNTER
"The following is email communication with Jennifer today:    \"Hi Bee, Just thought I'd give you an update as to what's been going on since our last emails of February 25th. PEG tube issue totally resolved. No problems with it. I stopped rubbing my chin so hard from the secretions that seem to come from  Hi Bee,     Just thought I'd give you an update as to what's been going on since our last emails of February 25th.     PEG tube issue totally resolved.  No problems with it.     I stopped rubbing my chin so hard from the secretions that seem to come from nowhere and dribble down my chin. Even though I stopped rubbing it, the redness remained.  A few days after our last emails, the skin on my chin and neck right below it started to peel.  Fingernail size flakes that were pure white. I would put Aquaphor on my chin and neck and it was good for a while but a few hours later, it was back.  One small area right next to the scar on my chin that runs into my neck peeled the worse.  It looked raw but did not hurt at all to touch it.  I figured that it was strictly a skin issue, so I got an appointment to see my dermatologist, Brittnee Hopkins, on March 4th. She put me on an antibiotic (Doxcycline) for 10 days of which I still have 3 to go.  She also prescribed a hydrocortisone cream for me to put on it twice a day.  Within a couple days, the redness had completely disappeared as had the peeling skin. And that one spot along my scar had developed a scab that dried and fell off.  And the off and on swelling I was having which you thought sounded like lymphedema has completely gone away.  That one area along my scar still is soft and puffy but not red or raw looking at all.  I see Brittnee again this morning for a follow up.     Do you think that I should let Dr. Camacho take a look at it or just continue only with my dermatologist? Or is it even necessary to see him at all about this?   I would appreciate it if you'd let me know " "what you think.  Thanks, Bee.     Hope you have a wonderful day.     Jennifer \"    My response back was:  \"You need to still see Dr. Camacho.\"    Jennifer did reply:    \"Just wanted you to know that Dr. Camacho's office called and Set an appointment for me to see Dr. Camacho in [place] on March 25th at 10:30. See you then.  Jennifer\"        "

## 2025-03-11 NOTE — Clinical Note
2025     No Recipients    Patient: Jennifer Hua   YOB: 1940   Date of Visit: 3/11/2025       Dear Dr. Kim Recipients:    Thank you for referring Jennifer Hua to me for evaluation. Below are my notes for this consultation.  If you have questions, please do not hesitate to call me. I look forward to following your patient along with you.       Sincerely,     Brittnee Hopkins, APRN-CNP      CC: No Recipients  ______________________________________________________________________________________    Subjective    Jennifer Hua is a 85 y.o. female who presents for the following: Rash.   Established patient in for follow up last seen one week ago and prescribed to   doxycycline (Vibra-Tabs) 100 mg tablet  Take 1 tablet (100 mg) by mouth 2 times a day for 10 days     hydrocortisone 2.5 % cream  Apply topically 2 times a day for 14 days    Review of Systems:  No other skin or systemic complaints other than what is documented elsewhere in the note.    The following portions of the chart were reviewed this encounter and updated as appropriate:       Skin Cancer History  No skin cancer on file.    Specialty Problems          Dermatology Problems    Actinic keratosis    Inflamed seborrheic keratosis    Neoplasm of uncertain behavior of skin    Abnormal granulation tissue of abdomen     Past Medical History:  Jennifer Hua  has a past medical history of Personal history of malignant neoplasm, unspecified, Personal history of other diseases of the circulatory system, Personal history of other diseases of the nervous system and sense organs, Personal history of other endocrine, nutritional and metabolic disease, and Personal history of other endocrine, nutritional and metabolic disease.    Past Surgical History:  Jennifer Hua  has a past surgical history that includes Rotator cuff repair (2018); Appendectomy (2018);  section, classic (2018); Hernia repair (2018);  Tonsillectomy (01/30/2018); Foot surgery (01/30/2018); Back surgery (01/30/2018); and Hand surgery (01/30/2018).    Family History:  Patient family history includes Allergies in an other family member.    Social History:  Jennifer Hua  reports that she quit smoking about 41 years ago. Her smoking use included cigarettes. She has never used smokeless tobacco. No history on file for alcohol use and drug use.    Allergies:  Ace inhibitors, Carbamazepine, Shellfish containing products, and Simvastatin    Current Medications / CAM's:    Current Outpatient Medications:     atorvastatin (Lipitor) 40 mg tablet, Take by mouth., Disp: , Rfl:     Betasept Surgical Scrub 4 % external liquid, USE AS DIRECTED. (Patient not taking: Reported on 1/14/2025), Disp: , Rfl:     chlorhexidine (Peridex) 0.12 % solution, Use in the mouth or throat 4 times a day. (Patient not taking: Reported on 1/14/2025), Disp: , Rfl:     doxycycline (Vibra-Tabs) 100 mg tablet, Take 1 tablet (100 mg) by mouth 2 times a day for 10 days. Take with a full glass of water and do not lie down for at least 30 minutes after., Disp: 20 tablet, Rfl: 0    doxycycline (Vibra-Tabs) 100 mg tablet, Take 1 tablet (100 mg) by mouth 2 times a day for 14 days. Take with a full glass of water and do not lie down for at least 30 minutes after., Disp: 28 tablet, Rfl: 0    enoxaparin (Lovenox) 40 mg/0.4 mL syringe, , Disp: , Rfl:     enoxaparin (Lovenox) 60 mg/0.6 mL syringe, Inject 0.5 mL (50 mg) under the skin every 12 hours. (Patient not taking: Reported on 1/14/2025), Disp: , Rfl:     latanoprost (Xalatan) 0.005 % ophthalmic solution, Administer into affected eye(s)., Disp: , Rfl:     levothyroxine (Synthroid, Levoxyl) 50 mcg tablet, TAKE 1 TABLET DAILY IN THE MORNING BEFORE A MEAL ON AN EMPTY STOMACH WITH WATER ONLY & WAIT 1 HOUR BEFORE TAKING FOOD OR OTHER MEDICATIONS, Disp: 90 tablet, Rfl: 2    nystatin (Mycostatin) 100,000 unit/mL suspension, Take 2 mL (200,000  Units) by mouth 4 times a day. (Patient not taking: Reported on 1/14/2025), Disp: , Rfl:     oxyCODONE (Roxicodone) 5 mg immediate release tablet, Take 1 tablet (5 mg) by mouth every 4 hours if needed. (Patient not taking: Reported on 1/14/2025), Disp: , Rfl:     oxyCODONE (Roxicodone) 5 mg/5 mL solution, , Disp: , Rfl:     PreviDent 1.1 % gel, USE ONE TIME PER DAY IN FLUORIDE CARRIERS  FOR 10 MINUTES, Disp: , Rfl:     sodium chloride 0.9 % irrigation solution, FOR USE WITH TRACHEOSTOMY CARE AND SUCTIONING THREE TIMES A DAY AND AS NEEDED (Patient not taking: Reported on 1/14/2025), Disp: , Rfl:     triamcinolone (Kenalog) 0.1 % cream, Apply topically 2 times a day as needed for rash. (Patient not taking: Reported on 1/14/2025), Disp: 80 g, Rfl: 5    vitamin E 1,150 unit/1.25 mL liquid, Take by mouth., Disp: , Rfl:     warfarin (Coumadin) 5 mg tablet, Take by mouth., Disp: , Rfl:      Objective  Well appearing patient in no apparent distress; mood and affect are within normal limits.      Assessment/Plan  1. Rash and other nonspecific skin eruption  Anterior Mid Neck, Left Anterior Mandible, Left Anterior Neck, Left Buccal Cheek, Left Chin, Left Lower Cutaneous Lip, Left Lower Vermilion Lip, Mid Chin, Mid Lower Cutaneous Lip, Mid Lower Vermilion Lip, Right Anterior Neck, Right Buccal Cheek, Right Chin, Right Lower Cutaneous Lip, Right Lower Vermilion Lip  Significant improvement in 1 week of red, peeling skin in a 85 year old female with a history of tongue cancer.  She was given doxycycline 100mg twice daily and hydrocortisone 2.5% cream.  With the exception of her left chin which has a 1.5cm soft, nontender, area of inflammation.  She denies pain, bleeding, and itching.  Upon attempt to palpate her lymph nodes, there is a new hard, firmness to her bilateral neck that she states is worse than baseline.  She is having no new issues with swallowing, breathing, or movement.  She did reach out to oncology to determine  if sooner follow up than 4/8/2025.                  - Continue doxycycline 100mg for 2 weeks and then discontinue  - Discontinue topical hydrocortisone 2.5% cream   - Follow up with ENT for neck firmness  - RTC if this reoccurs.  Patient stated at last visit this is the third occurrence treated by urgent care/PCP    Related Medications  doxycycline (Vibra-Tabs) 100 mg tablet  Take 1 tablet (100 mg) by mouth 2 times a day for 10 days. Take with a full glass of water and do not lie down for at least 30 minutes after.    doxycycline (Vibra-Tabs) 100 mg tablet  Take 1 tablet (100 mg) by mouth 2 times a day for 14 days. Take with a full glass of water and do not lie down for at least 30 minutes after.

## 2025-03-11 NOTE — PROGRESS NOTES
Subjective     Jennifer Hua is a 85 y.o. female who presents for the following: Rash.   Established patient in for follow up last seen one week ago and prescribed to   doxycycline (Vibra-Tabs) 100 mg tablet  Take 1 tablet (100 mg) by mouth 2 times a day for 10 days     hydrocortisone 2.5 % cream  Apply topically 2 times a day for 14 days    Review of Systems:  No other skin or systemic complaints other than what is documented elsewhere in the note.    The following portions of the chart were reviewed this encounter and updated as appropriate:       Skin Cancer History  No skin cancer on file.    Specialty Problems          Dermatology Problems    Actinic keratosis    Inflamed seborrheic keratosis    Neoplasm of uncertain behavior of skin    Abnormal granulation tissue of abdomen     Past Medical History:  Jennifer Hua  has a past medical history of Personal history of malignant neoplasm, unspecified, Personal history of other diseases of the circulatory system, Personal history of other diseases of the nervous system and sense organs, Personal history of other endocrine, nutritional and metabolic disease, and Personal history of other endocrine, nutritional and metabolic disease.    Past Surgical History:  Jennifer Hua  has a past surgical history that includes Rotator cuff repair (2018); Appendectomy (2018);  section, classic (2018); Hernia repair (2018); Tonsillectomy (2018); Foot surgery (2018); Back surgery (2018); and Hand surgery (2018).    Family History:  Patient family history includes Allergies in an other family member.    Social History:  Jennifer Hua  reports that she quit smoking about 41 years ago. Her smoking use included cigarettes. She has never used smokeless tobacco. No history on file for alcohol use and drug use.    Allergies:  Ace inhibitors, Carbamazepine, Shellfish containing products, and Simvastatin    Current Medications  / CAM's:    Current Outpatient Medications:     atorvastatin (Lipitor) 40 mg tablet, Take by mouth., Disp: , Rfl:     Betasept Surgical Scrub 4 % external liquid, USE AS DIRECTED. (Patient not taking: Reported on 1/14/2025), Disp: , Rfl:     chlorhexidine (Peridex) 0.12 % solution, Use in the mouth or throat 4 times a day. (Patient not taking: Reported on 1/14/2025), Disp: , Rfl:     doxycycline (Vibra-Tabs) 100 mg tablet, Take 1 tablet (100 mg) by mouth 2 times a day for 10 days. Take with a full glass of water and do not lie down for at least 30 minutes after., Disp: 20 tablet, Rfl: 0    doxycycline (Vibra-Tabs) 100 mg tablet, Take 1 tablet (100 mg) by mouth 2 times a day for 14 days. Take with a full glass of water and do not lie down for at least 30 minutes after., Disp: 28 tablet, Rfl: 0    enoxaparin (Lovenox) 40 mg/0.4 mL syringe, , Disp: , Rfl:     enoxaparin (Lovenox) 60 mg/0.6 mL syringe, Inject 0.5 mL (50 mg) under the skin every 12 hours. (Patient not taking: Reported on 1/14/2025), Disp: , Rfl:     latanoprost (Xalatan) 0.005 % ophthalmic solution, Administer into affected eye(s)., Disp: , Rfl:     levothyroxine (Synthroid, Levoxyl) 50 mcg tablet, TAKE 1 TABLET DAILY IN THE MORNING BEFORE A MEAL ON AN EMPTY STOMACH WITH WATER ONLY & WAIT 1 HOUR BEFORE TAKING FOOD OR OTHER MEDICATIONS, Disp: 90 tablet, Rfl: 2    nystatin (Mycostatin) 100,000 unit/mL suspension, Take 2 mL (200,000 Units) by mouth 4 times a day. (Patient not taking: Reported on 1/14/2025), Disp: , Rfl:     oxyCODONE (Roxicodone) 5 mg immediate release tablet, Take 1 tablet (5 mg) by mouth every 4 hours if needed. (Patient not taking: Reported on 1/14/2025), Disp: , Rfl:     oxyCODONE (Roxicodone) 5 mg/5 mL solution, , Disp: , Rfl:     PreviDent 1.1 % gel, USE ONE TIME PER DAY IN FLUORIDE CARRIERS  FOR 10 MINUTES, Disp: , Rfl:     sodium chloride 0.9 % irrigation solution, FOR USE WITH TRACHEOSTOMY CARE AND SUCTIONING THREE TIMES A DAY AND  AS NEEDED (Patient not taking: Reported on 1/14/2025), Disp: , Rfl:     triamcinolone (Kenalog) 0.1 % cream, Apply topically 2 times a day as needed for rash. (Patient not taking: Reported on 1/14/2025), Disp: 80 g, Rfl: 5    vitamin E 1,150 unit/1.25 mL liquid, Take by mouth., Disp: , Rfl:     warfarin (Coumadin) 5 mg tablet, Take by mouth., Disp: , Rfl:      Objective   Well appearing patient in no apparent distress; mood and affect are within normal limits.      Assessment/Plan   1. Rash and other nonspecific skin eruption  Anterior Mid Neck, Left Anterior Mandible, Left Anterior Neck, Left Buccal Cheek, Left Chin, Left Lower Cutaneous Lip, Left Lower Vermilion Lip, Mid Chin, Mid Lower Cutaneous Lip, Mid Lower Vermilion Lip, Right Anterior Neck, Right Buccal Cheek, Right Chin, Right Lower Cutaneous Lip, Right Lower Vermilion Lip  Significant improvement in 1 week of red, peeling skin in a 85 year old female with a history of tongue cancer.  She was given doxycycline 100mg twice daily and hydrocortisone 2.5% cream.  With the exception of her left chin which has a 1.5cm soft, nontender, area of inflammation.  She denies pain, bleeding, and itching.  Upon attempt to palpate her lymph nodes, there is a new hard, firmness to her bilateral neck that she states is worse than baseline.  She is having no new issues with swallowing, breathing, or movement.  She did reach out to oncology to determine if sooner follow up than 4/8/2025.                  - Continue doxycycline 100mg for 2 weeks and then discontinue  - Discontinue topical hydrocortisone 2.5% cream   - Follow up with ENT for neck firmness  - RTC if this reoccurs.  Patient stated at last visit this is the third occurrence treated by urgent care/PCP    Related Medications  doxycycline (Vibra-Tabs) 100 mg tablet  Take 1 tablet (100 mg) by mouth 2 times a day for 10 days. Take with a full glass of water and do not lie down for at least 30 minutes  after.    doxycycline (Vibra-Tabs) 100 mg tablet  Take 1 tablet (100 mg) by mouth 2 times a day for 14 days. Take with a full glass of water and do not lie down for at least 30 minutes after.

## 2025-03-24 NOTE — PROGRESS NOTES
Provider Impressions     Large base of tongue lesion which was treated with a combination of chemotherapy and radiation. There is no evidence of any tumor recurrence.       Status post surgery and radiation therapy for a second primary on the contralateral oral tongue and oropharynx.  I do not see any evidence of tumor recurrence.     Dysphagia.  She is PEG dependent.  The PEG tube needs to be changed.    Hypothyroidism for which she is on Synthroid.     Recent issues with the infection and drainage in the mental region.  She is on antibiotics and will be kept on antibiotics for now.  A CT scan of the neck with contrast will be obtained.    I will see her after the scan.    Chief Complaint     Follow-up status post surgery for the management of a left oral cavity and oropharyngeal cancer.      History of Present Illness    This lady was seen for the management of a squamous cell carcinoma of the base of tongue. She was diagnosed with a T4 N0 lesion of her base of tongue in February 2018. The tumor was P 16 positive. She was treated with a combination of chemotherapy and radiation and completed her treatments on April 25, 2018. She had a PET scan in March 2019 which was negative. She has had a TSH level in September 2024 which was normal. She is on Synthroid. She had some chest scanning in October 2024 which was not completely normal and a repeat scan is suggested.. She was seen in January 2023 because of a concern about a left tongue lesion. On biopsy this turned out to be consistent with squamous cell carcinoma. She had surgery on March 2, 2023. This was followed by radiation therapy. She has had 2 swallow evaluation which she failed. She remains PEG dependent.  I saw her in May 2024 with some swelling around the right jaw.  She has responded to antibiotics.  They were eventually stopped.  I did send her for CT scanning which really did not show any obvious radionecrosis.  For the past 2 weeks or so she has had  some issues with puffiness and redness of the chin.  She has had some drainage as well.  She has been put on antibiotics and the area seems to be improving.  She continues the vitamin E.  She cannot be on Trental because we cannot give it through the PEG.    Physical Exam    Examination of the oral cavity shows a lot of changes from the prior surgeries.  I cannot appreciate any worrisome mucosal lesions.  She does have a full dentition.  There is no loose teeth.  She does have a few caps.  Palpation of the parotid, neck, thyroid feel fails to show any worrisome masses or adenopathies.  She has some drainage from the mental area.  There there are 2 separate areas of skin breakdown.  I cannot see the underlying plate.    A flexible laryngoscopy was carried out. Under topical Xylocaine and Darío-Synephrine the scope was introduced through the nostril. The nasopharynx, base of tongue, hypopharynx, and larynx are visualized.  The vocal cords are normally mobile. There is no pooling of secretions in the piriform sinuses. There is no evidence of any mucosal lesions.

## 2025-03-25 ENCOUNTER — APPOINTMENT (OUTPATIENT)
Facility: CLINIC | Age: 85
End: 2025-03-25
Payer: MEDICARE

## 2025-03-25 VITALS — BODY MASS INDEX: 19.56 KG/M2 | HEIGHT: 63 IN | WEIGHT: 110.4 LBS

## 2025-03-25 DIAGNOSIS — L02.01 SUBMENTAL ABSCESS: ICD-10-CM

## 2025-03-25 DIAGNOSIS — R13.12 OROPHARYNGEAL DYSPHAGIA: ICD-10-CM

## 2025-03-25 DIAGNOSIS — C06.9: ICD-10-CM

## 2025-03-25 DIAGNOSIS — E03.9 ACQUIRED HYPOTHYROIDISM: ICD-10-CM

## 2025-03-25 DIAGNOSIS — C01 CANCER OF BASE OF TONGUE (MULTI): Primary | ICD-10-CM

## 2025-03-25 PROCEDURE — 1126F AMNT PAIN NOTED NONE PRSNT: CPT | Performed by: OTOLARYNGOLOGY

## 2025-03-25 PROCEDURE — 31575 DIAGNOSTIC LARYNGOSCOPY: CPT | Performed by: OTOLARYNGOLOGY

## 2025-03-25 PROCEDURE — 1159F MED LIST DOCD IN RCRD: CPT | Performed by: OTOLARYNGOLOGY

## 2025-03-25 PROCEDURE — 1157F ADVNC CARE PLAN IN RCRD: CPT | Performed by: OTOLARYNGOLOGY

## 2025-03-25 PROCEDURE — 1036F TOBACCO NON-USER: CPT | Performed by: OTOLARYNGOLOGY

## 2025-03-25 PROCEDURE — 1160F RVW MEDS BY RX/DR IN RCRD: CPT | Performed by: OTOLARYNGOLOGY

## 2025-03-25 PROCEDURE — 99213 OFFICE O/P EST LOW 20 MIN: CPT | Performed by: OTOLARYNGOLOGY

## 2025-03-25 RX ORDER — DOXYCYCLINE 100 MG/1
100 CAPSULE ORAL 2 TIMES DAILY
Qty: 60 CAPSULE | Refills: 0 | Status: SHIPPED | OUTPATIENT
Start: 2025-03-25 | End: 2025-04-24

## 2025-03-25 ASSESSMENT — PAIN SCALES - GENERAL: PAINLEVEL_OUTOF10: 0-NO PAIN

## 2025-03-25 ASSESSMENT — PATIENT HEALTH QUESTIONNAIRE - PHQ9
SUM OF ALL RESPONSES TO PHQ9 QUESTIONS 1 AND 2: 0
2. FEELING DOWN, DEPRESSED OR HOPELESS: NOT AT ALL
1. LITTLE INTEREST OR PLEASURE IN DOING THINGS: NOT AT ALL

## 2025-03-26 ENCOUNTER — TELEPHONE (OUTPATIENT)
Dept: OTOLARYNGOLOGY | Facility: CLINIC | Age: 85
End: 2025-03-26
Payer: MEDICARE

## 2025-03-26 LAB
CREAT SERPL-MCNC: 0.41 MG/DL (ref 0.6–0.95)
EGFRCR SERPLBLD CKD-EPI 2021: 96 ML/MIN/1.73M2
TSH SERPL-ACNC: 2.47 MIU/L (ref 0.4–4.5)

## 2025-03-26 NOTE — TELEPHONE ENCOUNTER
"Email received at 9:31 a.m. from Jennifer:    \"Good morning Bee,    Forgot to ask you yesterday and I'm assuming I don't, but do I have to stop my warfarin before my PEG tube is replaced?    Thanks.  Jennifer\"    I messaged her back at 10:40 a.m.:    \"Good morning Jennifer--    Per Dr. Camacho, no you do not need to stop the warfarin for the peg tube change.    Thanks,    Bee\"      "

## 2025-04-08 ENCOUNTER — APPOINTMENT (OUTPATIENT)
Facility: CLINIC | Age: 85
End: 2025-04-08
Payer: MEDICARE

## 2025-04-14 NOTE — PROGRESS NOTES
Provider Impressions     Large base of tongue lesion which was treated with a combination of chemotherapy and radiation. There is no evidence of any tumor recurrence.       Status post surgery and radiation therapy for a second primary on the contralateral oral tongue and oropharynx.  I do not see any evidence of tumor recurrence.     Dysphagia.  She is PEG dependent.  The PEG tube was changed today.    Hypothyroidism for which she is on Synthroid.     Recent issues with the infection and drainage in the mental region.  She is on antibiotics and will be kept on antibiotics for now.  There seems to be some erosion of the lower aspect of the anterior mandible.  I am waiting for the report from our radiologist.  This may need to be addressed surgically.    I will see her in 1 month.    Chief Complaint     Follow-up status post surgery for the management of a left oral cavity and oropharyngeal cancer.      History of Present Illness    This lady was seen for the management of a squamous cell carcinoma of the base of tongue. She was diagnosed with a T4 N0 lesion of her base of tongue in February 2018. The tumor was P 16 positive. She was treated with a combination of chemotherapy and radiation and completed her treatments on April 25, 2018. She had a PET scan in March 2019 which was negative. She has had a TSH level in September 2024 which was normal. She is on Synthroid. She had some chest scanning in October 2024 which was not completely normal and a repeat scan is suggested.. She was seen in January 2023 because of a concern about a left tongue lesion. On biopsy this turned out to be consistent with squamous cell carcinoma. She had surgery on March 2, 2023. This was followed by radiation therapy. She has had 2 swallow evaluation which she failed. She remains PEG dependent.  I saw her in May 2024 with some swelling around the right jaw.  She has responded to antibiotics.  They were eventually stopped.  I did send her  for CT scanning which really did not show any obvious radionecrosis.  For the past 2 weeks or so she has had some issues with puffiness and redness of the chin.  She has had some drainage as well.  She has been put on antibiotics and the area seems to be improving.  She continues the vitamin E.  She cannot be on Trental because we cannot give it through the PEG.    Physical Exam    Examination of the oral cavity shows a lot of changes from the prior surgeries.  I cannot appreciate any worrisome mucosal lesions.  She does have a full dentition.  There is 1 lower tooth that is somewhat mobile.  She does have a few caps.  Palpation of the parotid, neck, thyroid feel fails to show any worrisome masses or adenopathies.  She has some drainage from the mental area.      After verbal consent the PEG tube was changed.  The old one was removed and using a capsule dome replacement kit the new tube was placed without any difficulty.

## 2025-04-15 ENCOUNTER — OFFICE VISIT (OUTPATIENT)
Dept: OTOLARYNGOLOGY | Facility: HOSPITAL | Age: 85
End: 2025-04-15
Payer: MEDICARE

## 2025-04-15 ENCOUNTER — HOSPITAL ENCOUNTER (OUTPATIENT)
Dept: RADIOLOGY | Facility: HOSPITAL | Age: 85
Discharge: HOME | End: 2025-04-15
Payer: MEDICARE

## 2025-04-15 VITALS — TEMPERATURE: 97.7 F | WEIGHT: 109 LBS | BODY MASS INDEX: 19.31 KG/M2 | HEIGHT: 63 IN

## 2025-04-15 DIAGNOSIS — L02.01 SUBMENTAL ABSCESS: ICD-10-CM

## 2025-04-15 DIAGNOSIS — C06.9: ICD-10-CM

## 2025-04-15 DIAGNOSIS — C01 CANCER OF BASE OF TONGUE (MULTI): ICD-10-CM

## 2025-04-15 DIAGNOSIS — R13.12 OROPHARYNGEAL DYSPHAGIA: Primary | ICD-10-CM
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
## 2025-04-15 DIAGNOSIS — M27.2 OSTEORADIONECROSIS OF JAW: ICD-10-CM

## 2025-04-15 PROCEDURE — 1036F TOBACCO NON-USER: CPT | Performed by: OTOLARYNGOLOGY

## 2025-04-15 PROCEDURE — 1126F AMNT PAIN NOTED NONE PRSNT: CPT | Performed by: OTOLARYNGOLOGY

## 2025-04-15 PROCEDURE — 70491 CT SOFT TISSUE NECK W/DYE: CPT

## 2025-04-15 PROCEDURE — 1159F MED LIST DOCD IN RCRD: CPT | Performed by: OTOLARYNGOLOGY

## 2025-04-15 PROCEDURE — 99213 OFFICE O/P EST LOW 20 MIN: CPT | Mod: 25 | Performed by: OTOLARYNGOLOGY

## 2025-04-15 PROCEDURE — 99213 OFFICE O/P EST LOW 20 MIN: CPT | Performed by: OTOLARYNGOLOGY

## 2025-04-15 PROCEDURE — 1160F RVW MEDS BY RX/DR IN RCRD: CPT | Performed by: OTOLARYNGOLOGY

## 2025-04-15 PROCEDURE — 2550000001 HC RX 255 CONTRASTS: Performed by: OTOLARYNGOLOGY

## 2025-04-15 PROCEDURE — 1157F ADVNC CARE PLAN IN RCRD: CPT | Performed by: OTOLARYNGOLOGY

## 2025-04-15 PROCEDURE — 43762 RPLC GTUBE NO REVJ TRC: CPT | Performed by: OTOLARYNGOLOGY

## 2025-04-15 RX ORDER — DOXYCYCLINE 100 MG/1
100 CAPSULE ORAL 2 TIMES DAILY
Qty: 60 CAPSULE | Refills: 0 | Status: SHIPPED | OUTPATIENT
Start: 2025-04-15 | End: 2025-05-15

## 2025-04-15 RX ADMIN — IOHEXOL 75 ML: 350 INJECTION, SOLUTION INTRAVENOUS at 12:56

## 2025-04-15 ASSESSMENT — PAIN SCALES - GENERAL: PAINLEVEL_OUTOF10: 0-NO PAIN

## 2025-04-18 ENCOUNTER — TELEPHONE (OUTPATIENT)
Dept: OTOLARYNGOLOGY | Facility: HOSPITAL | Age: 85
End: 2025-04-18
Payer: MEDICARE

## 2025-04-18 NOTE — TELEPHONE ENCOUNTER
I tried to reach this patient regarding the results of the CT scan.  I also ran it by one of my colleagues.  It seems as though we do not have to do something surgically.  The patient was asked to call my office back so we can discuss that.

## 2025-04-18 NOTE — TELEPHONE ENCOUNTER
----- Message from Theo Cortez sent at 4/17/2025  3:30 PM EDT -----  Hi    I looked at it    There is some bone there that looks like it needs to be drilled away    Do you think a segmental?    Do you think the plate needs to be removed?    If possible for a segmental would prefer one of those premade plates especially with the prior plate and holes that are there    rr  ----- Message -----  From: Gerardo Camacho MD  Sent: 4/17/2025  12:52 PM EDT  To: Theo Cortez MD    Hi.  Can you please review that scan.  There is some erosion on the lower aspect of the mandible anteriorly.  I think this may need to be addressed surgically.  Thanks  ----- Message -----  From: Interface, Radiology Results In  Sent: 4/16/2025   8:28 AM EDT  To: Gerardo Camacho MD

## 2025-04-28 ENCOUNTER — APPOINTMENT (OUTPATIENT)
Dept: DERMATOLOGY | Facility: CLINIC | Age: 85
End: 2025-04-28
Payer: MEDICARE

## 2025-04-28 DIAGNOSIS — Z85.828 HISTORY OF NONMELANOMA SKIN CANCER: ICD-10-CM

## 2025-04-28 DIAGNOSIS — L81.4 LENTIGO: ICD-10-CM

## 2025-04-28 DIAGNOSIS — L82.1 SEBORRHEIC KERATOSIS: ICD-10-CM

## 2025-04-28 DIAGNOSIS — Z12.83 SCREENING EXAM FOR SKIN CANCER: ICD-10-CM

## 2025-04-28 DIAGNOSIS — R21 RASH AND OTHER NONSPECIFIC SKIN ERUPTION: ICD-10-CM

## 2025-04-28 DIAGNOSIS — D22.9 NEVUS: Primary | ICD-10-CM

## 2025-04-28 PROCEDURE — 1036F TOBACCO NON-USER: CPT | Performed by: NURSE PRACTITIONER

## 2025-04-28 PROCEDURE — 99213 OFFICE O/P EST LOW 20 MIN: CPT | Performed by: NURSE PRACTITIONER

## 2025-04-28 PROCEDURE — 1157F ADVNC CARE PLAN IN RCRD: CPT | Performed by: NURSE PRACTITIONER

## 2025-04-28 PROCEDURE — 1159F MED LIST DOCD IN RCRD: CPT | Performed by: NURSE PRACTITIONER

## 2025-04-28 NOTE — PROGRESS NOTES
Subjective     Jennifer Hua is a 85 y.o. female who presents for the following: Skin Check.   Established patient in for 6 month full body skin exam.      Review of Systems:  No other skin or systemic complaints other than what is documented elsewhere in the note.    The following portions of the chart were reviewed this encounter and updated as appropriate:       Skin Cancer History  Biopsy Log Book  No skin cancers from Specimen Tracking.    Additional History  SCC- left upper shoulder-         Specialty Problems          Dermatology Problems    Actinic keratosis    Inflamed seborrheic keratosis    Neoplasm of uncertain behavior of skin    Abnormal granulation tissue of abdomen     Past Medical History:  Jennifer Hua  has a past medical history of Personal history of malignant neoplasm, unspecified, Personal history of other diseases of the circulatory system, Personal history of other diseases of the nervous system and sense organs, Personal history of other endocrine, nutritional and metabolic disease, and Personal history of other endocrine, nutritional and metabolic disease.    Past Surgical History:  Jennifer Hua  has a past surgical history that includes Rotator cuff repair (2018); Appendectomy (2018);  section, classic (2018); Hernia repair (2018); Tonsillectomy (2018); Foot surgery (2018); Back surgery (2018); and Hand surgery (2018).    Family History:  Patient family history includes Allergies in an other family member.    Social History:  Jennifer Hua  reports that she quit smoking about 41 years ago. Her smoking use included cigarettes. She has never used smokeless tobacco. No history on file for alcohol use and drug use.    Allergies:  Ace inhibitors, Carbamazepine, Shellfish containing products, and Simvastatin    Current Medications / CAM's:  Current Medications[1]     Objective   Well appearing patient in no apparent distress;  mood and affect are within normal limits.      Assessment/Plan   Skin Exam  1. NEVUS  Generalized  Uniform pigmented macule(s)/papule(s) with reassuring findings on dermoscopy  -Discussed nature of condition  -Reassurance, benign-appearing features on examination today  -Recommend continued observation  2. RASH AND OTHER NONSPECIFIC SKIN ERUPTION      Related Procedures  Follow Up In Dermatology - Established Patient  3. LENTIGO  Generalized  Tan macules  -Benign appearing on exam  -Reassurance, recommend observation  4. SEBORRHEIC KERATOSIS  Generalized  Stuck on, waxy macule(s)/papule(s)/plaque(s) with comedo-like openings and milia like cysts  -Discussed nature of condition  -Reassurance, recommend continued observation  5. HISTORY OF NONMELANOMA SKIN CANCER  Generalized  Personal History of Non-Melanoma Skin Cancer  -Well healed scar(s) with no evidence of recurrence  -Discussed the need for annual or semi-annual skin examinations and to return sooner if any new or changing lesions are noticed. Patient verbalizes understanding  6. SCREENING EXAM FOR SKIN CANCER  Generalized            [1]   Current Outpatient Medications:     atorvastatin (Lipitor) 40 mg tablet, Take by mouth., Disp: , Rfl:     doxycycline (Monodox) 100 mg capsule, Take 1 capsule (100 mg) by mouth 2 times a day. Take with at least 8 ounces (large glass) of water, do not lie down for 30 minutes after, Disp: 60 capsule, Rfl: 0    latanoprost (Xalatan) 0.005 % ophthalmic solution, Administer into affected eye(s)., Disp: , Rfl:     levothyroxine (Synthroid, Levoxyl) 50 mcg tablet, TAKE 1 TABLET DAILY IN THE MORNING BEFORE A MEAL ON AN EMPTY STOMACH WITH WATER ONLY & WAIT 1 HOUR BEFORE TAKING FOOD OR OTHER MEDICATIONS, Disp: 90 tablet, Rfl: 2    PreviDent 1.1 % gel, USE ONE TIME PER DAY IN FLUORIDE CARRIERS  FOR 10 MINUTES, Disp: , Rfl:     triamcinolone (Kenalog) 0.1 % cream, Apply topically 2 times a day as needed for rash., Disp: 80 g, Rfl: 5     vitamin E 1,150 unit/1.25 mL liquid, Take by mouth., Disp: , Rfl:     warfarin (Coumadin) 5 mg tablet, Take by mouth., Disp: , Rfl:

## 2025-04-28 NOTE — Clinical Note
Personal History of Non-Melanoma Skin Cancer  -Well healed scar(s) with no evidence of recurrence  -Discussed the need for annual or semi-annual skin examinations and to return sooner if any new or changing lesions are noticed. Patient verbalizes understanding

## 2025-05-20 ENCOUNTER — OFFICE VISIT (OUTPATIENT)
Dept: OTOLARYNGOLOGY | Facility: HOSPITAL | Age: 85
End: 2025-05-20
Payer: MEDICARE

## 2025-05-20 VITALS — BODY MASS INDEX: 20.21 KG/M2 | WEIGHT: 114.1 LBS

## 2025-05-20 DIAGNOSIS — M27.2 OSTEORADIONECROSIS OF JAW: Primary | ICD-10-CM

## 2025-05-20 DIAGNOSIS — C06.9: ICD-10-CM
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
## 2025-05-20 DIAGNOSIS — E03.9 ACQUIRED HYPOTHYROIDISM: ICD-10-CM

## 2025-05-20 DIAGNOSIS — C01 CANCER OF BASE OF TONGUE (MULTI): ICD-10-CM

## 2025-05-20 PROCEDURE — 99213 OFFICE O/P EST LOW 20 MIN: CPT | Performed by: OTOLARYNGOLOGY

## 2025-05-20 PROCEDURE — 1159F MED LIST DOCD IN RCRD: CPT | Performed by: OTOLARYNGOLOGY

## 2025-05-20 PROCEDURE — 1160F RVW MEDS BY RX/DR IN RCRD: CPT | Performed by: OTOLARYNGOLOGY

## 2025-05-20 PROCEDURE — 1036F TOBACCO NON-USER: CPT | Performed by: OTOLARYNGOLOGY

## 2025-05-20 ASSESSMENT — PATIENT HEALTH QUESTIONNAIRE - PHQ9
1. LITTLE INTEREST OR PLEASURE IN DOING THINGS: NOT AT ALL
2. FEELING DOWN, DEPRESSED OR HOPELESS: NOT AT ALL
SUM OF ALL RESPONSES TO PHQ9 QUESTIONS 1 AND 2: 0

## 2025-05-20 NOTE — PROGRESS NOTES
Provider Impressions     Large base of tongue lesion which was treated with a combination of chemotherapy and radiation. There is no evidence of any tumor recurrence.       Status post surgery and radiation therapy for a second primary on the contralateral oral tongue and oropharynx.  I do not see any evidence of tumor recurrence.     Dysphagia.  She is PEG dependent.      Hypothyroidism for which she is on Synthroid.     Recent issues with the infection and drainage in the mental region.  She is on antibiotics and will be kept on antibiotics for now.  A prescription for liquid antibiotics is sent to her pharmacy.  There is some bony erosion inferiorly around the midline.  She will see one of my colleagues in the near future.  This may need to be addressed surgically.    I will see her in 1 month.    Chief Complaint     Follow-up status post surgery for the management of a left oral cavity and oropharyngeal cancer.      History of Present Illness    This lady was seen for the management of a squamous cell carcinoma of the base of tongue. She was diagnosed with a T4 N0 lesion of her base of tongue in February 2018. The tumor was P 16 positive. She was treated with a combination of chemotherapy and radiation and completed her treatments on April 25, 2018. She had a PET scan in March 2019 which was negative. She has had a TSH level in September 2024 which was normal. She is on Synthroid. She had some chest scanning in October 2024 which was not completely normal and a repeat scan is suggested.. She was seen in January 2023 because of a concern about a left tongue lesion. On biopsy this turned out to be consistent with squamous cell carcinoma. She had surgery on March 2, 2023. This was followed by radiation therapy. She has had 2 swallow evaluation which she failed. She remains PEG dependent.  I saw her in May 2024 with some swelling around the right jaw.  She has responded to antibiotics.  They were eventually stopped.    She has responded to the doxycycline.  The recent scan does show some bony erosion of the inferior mandible anteriorly.  She has not had any drainage since I last saw her.  She continues the vitamin E.  She cannot be on Trental because we cannot give it through the PEG.    Physical Exam    Examination of the oral cavity shows a lot of changes from the prior surgeries.  I cannot appreciate any worrisome mucosal lesions.  She does have a full dentition.  There is 1 lower tooth that is somewhat mobile.  She does have a few caps.  Palpation of the parotid, neck, thyroid feel fails to show any worrisome masses or adenopathies.  There is no evidence of any collection or infection or drainage.

## 2025-05-21 ENCOUNTER — TELEPHONE (OUTPATIENT)
Dept: OTOLARYNGOLOGY | Facility: CLINIC | Age: 85
End: 2025-05-21
Payer: MEDICARE

## 2025-05-21 NOTE — TELEPHONE ENCOUNTER
"Email communication with Jennifer:    \"Better yet.  Thanks again.    On Wednesday, May 21, 2025 at 09:49:24 AM Shayan RUVALCABA Cheryl <[email or web address]> wrote:       No June 24th is at [facility].     From: Jennifer Hua <[email or web address]>   Sent: Wednesday, May 21, 2025 9:21 AM  To: Bee Downey <[email or web address]>  Subject: Re: Question     Good morning Bee, 1. That's fine about keeping my present appt with Dr. Thornton. Just wanted to make sure. 2. The 8:?30 appt on June 24th works best. Still at the main campus? Thanks for getting it changed. Jennifer On Wednesday, May 21, 2025  Good morning Bee,     1.  That's fine about keeping my present appt with Dr. Thornton.  Just wanted to make sure.      2.  The 8:30 appt on June 24th works best.  Still at the main campus?  Thanks for getting it changed.     Jennifer  On Wednesday, May 21, 2025 at 08:46:09 AM Shayan RUVALCABA Cheryl <[email or web address]> wrote:         Good morning Jennifer--     1.        Dr. Camacho wants you to keep your apt with Dr. Cortez as scheduled.  2.       I can move your apt to June 24th.  I can work you in at 8:30 a.m. or 2:30 p.m.  Please let me know which one you want.     Thanks,     Bee     From: Jennifer Hua <[email or web address]>   Sent: Tuesday, May 20, 2025 6:36 PM  To: Bee Downey <[email or web address]>  Subject: Question     Johan Gamboa, I saw Dr. Camacho today and he's keeping me on the antibiotics and wants to see me in a month and do another scan at 3 moths from my first one. Was told he wasn't available June 24th so was given the appointment for July 1st. He is  Johan Gamboa,     I saw Dr. Camacho today and he's keeping me on the antibiotics and wants to see me in a month and do another scan at 3 moths from my first one.  Was told he wasn't available June 24th so was given the appointment for July 1st.  He is going to reevaluate my jaw at that time.     Anyhow, my question is about seeing  " "Norberto on May 30th.  Is it even necessary to see him at all now or should it be rescheduled until after I see Dr. Camacho on July 1st?   It seems to be unproductive if Dr. Thornton didn't have the most recent information to go on. Don't want to waste his time if he doesn't have the newest evaluation from Dr. Camacho.  Wish I had thought of this to ask Dr. Camacho directly about it, but unfortunately, I didn't, and now you're my \"go to\" source (as always) to find out.     Thanks Bee and have a good day.  Jennifer\"    Apt moved to 8:30 a.m. on Tuesday, June 24th at [facility].    "

## 2025-05-22 ENCOUNTER — TELEPHONE (OUTPATIENT)
Dept: OTOLARYNGOLOGY | Facility: HOSPITAL | Age: 85
End: 2025-05-22
Payer: MEDICARE
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
## 2025-05-22 DIAGNOSIS — M27.2 OSTEORADIONECROSIS OF JAW: ICD-10-CM

## 2025-05-22 RX ORDER — DOXYCYCLINE 100 MG/1
100 TABLET ORAL 2 TIMES DAILY
Qty: 60 TABLET | Refills: 0 | Status: SHIPPED | OUTPATIENT
Start: 2025-05-22 | End: 2025-06-21

## 2025-05-22 NOTE — TELEPHONE ENCOUNTER
"Email received 5/21/25 @ 7:56 p.m.:    \"Johan Gamboa,    Spent a great deal of time on the phone last night (well over a half hour) trying to get understood by [facility] regarding the Doxycycline syrup script that Dr. Camacho had ordered for me. They had sent me an email saying there was a \"fulfillment issue\" with it.  i finally got through to a pharmacist and was told it was not only out of stock but has been discontinued.    I'll be out of the capsules that you had ordered for me in 4 more days.  But they create a small problem for me because I can't just pull them apart to get the powder out.  I have to cut them open with a razor blade and the powder gets scattered all over and I have to scoop it up to collect it all.     Could you please do the script for Doxycycline in the tablet form since the syrup is o longer available and issue it to [facility] in [place]?   I was told by the pharmacist at [facility] that they would not be able to get it to me before 7 days after they received the script for tablets due to the fact that they are located in [place], [place].  I'd be out before I received it from them.  I would be able to pick it up from [facility] in a day or two and not have a break in continuing with it.    Radha Gamboa.      Jennifer\"    Prescription pended for Dr. Camacho's authorization.    My email response back to Jennifer:    \"Good morning Jennifer--    I was informed that you wanted liquid form of the medication for easier use with the feeding tube.  However, I tried to advise everyone on Tuesday that the liquid form of doxycycline was not going to be easy to get if at all, and if able to get would likely be very expensive.    Prescription for the tablets will be pended for Dr. Camacho's authorization.  I'm not sure if they will need to order that since most pharmacies carry it in the capsule form.  Fingers crossed.    Bee Blum\"        "

## 2025-05-23 ENCOUNTER — APPOINTMENT (OUTPATIENT)
Dept: OTOLARYNGOLOGY | Facility: CLINIC | Age: 85
End: 2025-05-23
Payer: MEDICARE

## 2025-05-30 ENCOUNTER — APPOINTMENT (OUTPATIENT)
Dept: OTOLARYNGOLOGY | Facility: CLINIC | Age: 85
End: 2025-05-30
Payer: MEDICARE

## 2025-05-30 VITALS — WEIGHT: 114 LBS | BODY MASS INDEX: 20.19 KG/M2

## 2025-05-30 DIAGNOSIS — C01 CANCER OF BASE OF TONGUE (MULTI): Primary | ICD-10-CM

## 2025-05-30 DIAGNOSIS — M87.9 OSTEONECROSIS OF MANDIBLE (MULTI): ICD-10-CM

## 2025-05-30 PROCEDURE — 1160F RVW MEDS BY RX/DR IN RCRD: CPT | Performed by: OTOLARYNGOLOGY

## 2025-05-30 PROCEDURE — 1159F MED LIST DOCD IN RCRD: CPT | Performed by: OTOLARYNGOLOGY

## 2025-05-30 PROCEDURE — 99214 OFFICE O/P EST MOD 30 MIN: CPT | Performed by: OTOLARYNGOLOGY

## 2025-06-03 PROBLEM — M87.9 OSTEONECROSIS OF MANDIBLE (MULTI): Status: ACTIVE | Noted: 2025-06-03

## 2025-06-03 NOTE — PROGRESS NOTES
T4 N0 lesion of her base of tongue in February 2018. The tumor was P 16 positive. She was treated with a combination of chemotherapy and radiation and completed her treatments on April 25, 2018     Second primary oral cavity and oropharynx contralateral lesion treated with surgery flap and postoperative radiotherapy in 2023      Recently developed swelling and drainage in the submental area which has stopped since being on chronic antibiotics    Also with the CT scan showing increased erosion of the anterior symphysis     Here for consideration and discussion of potential surgical intervention    She is here with her daughter  She is asymptomatic            Physical Exam:  CONSTITUTIONAL:  No acute distress  VOICE:  No hoarseness or other abnormality  RESPIRATION:  Breathing comfortably, no stridor  CV:  No clubbing/cyanosis/edema in hands  EYES:  EOM intact, sclera normal  NEURO:  Alert and oriented times 3, Cranial nerves II-XII grossly intact and symmetric bilaterally  HEAD AND FACE:  Symmetric facial features, no masses or lesions, sinuses non-tender to palpation  SALIVARY GLANDS:  Parotid and submandibular glands normal bilaterally  EARS:  Normal external ears, external auditory canals, and TMs to otoscopy, normal hearing to whispered voice.  NOSE:  External nose midline, anterior rhinoscopy is normal with limited visualization to the anterior aspect of the interior turbinates, no bleeding or drainage, no lesions  ORAL CAVITY/OROPHARYNX/LIPS: Extensive oral cavity changes with radiotherapy and flap reconstruction no lesions , no bone exposure      PHARYNGEAL WALLS:  No masses or lesions  NECK/LYMPH:  No LAD, no thyroid masses, trachea midline  SKIN: Extensive surgical scarring, small punctate area raised granulation without friability in the submental area, not able to express any purulence no fluid collection  PSYCH:  Alert and oriented with appropriate mood and affect      CT scan reviewed with the patient  showing what appears to be lucency at a prior mandibulotomy site and areas of lucency in the anterior symphysis separately raising suspicion of 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 region              ORN    Lengthy and detailed discussion held with patient regarding surgery which would involve debridement of the region with possible soft tissue flap reconstruction if suitable bone is remaining versus anterior composite resection with the level of complexity secondary to the previous reconstruction bar in the vicinity and what appears to be lucency at a prior osteotomy site which would further complicate the length of bone needed to be replaced versus a VSP bar being utilized for the revision procedure to span that prior area if there is a nonunion present or encounter    This may also then leave a segment of nonvital bone between the anterior osteotomies for the ORN and that osteotomy utilized for access in the past therefore larger segment of bone would need to be prepared for replacement if a simple debridement is not adequate at the time of surgery        In discussion with the patient she is not interested in anything aggressive especially given she is asymptomatic and she would be interested in chronic suppressive antibiotics rather than aggressive surgery        She is scheduled to see Dr. Lux back in July with potential repeat scans and we will follow this      She understands the risk of pathologic fracture and/or increased drainage or swelling and will let us know if any symptoms develop

## 2025-06-23 NOTE — PROGRESS NOTES
Provider Impressions     Large base of tongue lesion which was treated with a combination of chemotherapy and radiation. There is no evidence of any tumor recurrence.       Status post surgery and radiation therapy for a second primary on the contralateral oral tongue and oropharynx.  I do not see any evidence of tumor recurrence.     Dysphagia.  She is PEG dependent.      Hypothyroidism for which she is on Synthroid.     Recent issues with the infection and drainage in the mental region.  She is on antibiotics and will be kept on antibiotics for now.  We will reduce the antibiotics to once a day.  We will hold off on any surgery at this point.  I will repeat some scans in the future.    Facial and skin rash from an unknown reason.  She  is put on Medrol Dosepak.  I will see her in 2 months.    Chief Complaint     Follow-up status post surgery for the management of a left oral cavity and oropharyngeal cancer.      History of Present Illness    This lady was seen for the management of a squamous cell carcinoma of the base of tongue. She was diagnosed with a T4 N0 lesion of her base of tongue in February 2018. The tumor was P 16 positive. She was treated with a combination of chemotherapy and radiation and completed her treatments on April 25, 2018. She had a PET scan in March 2019 which was negative.  She was seen in January 2023 because of a concern about a left tongue lesion. On biopsy this turned out to be consistent with squamous cell carcinoma. She had surgery on March 2, 2023. This was followed by radiation therapy. She has had 2 swallow evaluation which she failed. She remains PEG dependent.  I saw her in May 2024 with some swelling around the right jaw.  She has responded to antibiotics.  They were eventually stopped.   She has responded to the doxycycline.  The recent scan does show some bony erosion of the inferior mandible anteriorly.  She has not had any drainage since I last saw her.  She continues the  vitamin E.  She cannot be on Trental because we cannot give it through the PEG.  She has a TSH in the March 2025 which was normal.  She is on Synthroid.  She had a chest CT in February 2025.  There was no evidence of any metastatic disease.  May be some inflammation possibly from aspiration.  She comes in today with some skin lesions around her face and neck.    Physical Exam    Examination of the oral cavity shows a lot of changes from the prior surgeries.  I cannot appreciate any worrisome mucosal lesions.  She does have a full dentition.  There is 1 lower tooth that is somewhat mobile.  She does have a few caps.  Palpation of the parotid, neck, thyroid feel fails to show any worrisome masses or adenopathies.  There is no evidence of any collection or infection or drainage.  She does have some skin lesions that are somewhat red.  There are many of them in her face and neck.  It looks like an allergic skin reaction.    A flexible laryngoscopy was carried out. Under topical Xylocaine and Darío-Synephrine the scope was introduced through the nostril. The nasopharynx, base of tongue, hypopharynx, and larynx are visualized.  The vocal cords are normally mobile. There is pooling of secretions in the piriform sinuses. There is no evidence of any mucosal lesions.

## 2025-06-24 ENCOUNTER — APPOINTMENT (OUTPATIENT)
Facility: CLINIC | Age: 85
End: 2025-06-24
Payer: MEDICARE

## 2025-06-24 VITALS — WEIGHT: 112 LBS | BODY MASS INDEX: 19.84 KG/M2 | HEIGHT: 63 IN

## 2025-06-24 DIAGNOSIS — E03.9 ACQUIRED HYPOTHYROIDISM: ICD-10-CM

## 2025-06-24 DIAGNOSIS — R13.12 OROPHARYNGEAL DYSPHAGIA: ICD-10-CM

## 2025-06-24 DIAGNOSIS — R21 RASH AND NONSPECIFIC SKIN ERUPTION: ICD-10-CM

## 2025-06-24 DIAGNOSIS — C01 CANCER OF BASE OF TONGUE (MULTI): Primary | ICD-10-CM

## 2025-06-24 DIAGNOSIS — M27.2 OSTEORADIONECROSIS OF JAW: ICD-10-CM
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
## 2025-06-24 PROCEDURE — 1159F MED LIST DOCD IN RCRD: CPT | Performed by: OTOLARYNGOLOGY

## 2025-06-24 PROCEDURE — 1036F TOBACCO NON-USER: CPT | Performed by: OTOLARYNGOLOGY

## 2025-06-24 PROCEDURE — 1160F RVW MEDS BY RX/DR IN RCRD: CPT | Performed by: OTOLARYNGOLOGY

## 2025-06-24 PROCEDURE — 99213 OFFICE O/P EST LOW 20 MIN: CPT | Performed by: OTOLARYNGOLOGY

## 2025-06-24 PROCEDURE — 31575 DIAGNOSTIC LARYNGOSCOPY: CPT | Performed by: OTOLARYNGOLOGY

## 2025-06-24 RX ORDER — METHYLPREDNISOLONE 4 MG/1
TABLET ORAL
Qty: 21 TABLET | Refills: 0 | Status: SHIPPED | OUTPATIENT
Start: 2025-06-24

## 2025-06-24 RX ORDER — DOXYCYCLINE 100 MG/1
100 TABLET ORAL DAILY
Qty: 30 TABLET | Refills: 2 | Status: SHIPPED | OUTPATIENT
Start: 2025-06-24 | End: 2025-09-22

## 2025-07-01 ENCOUNTER — APPOINTMENT (OUTPATIENT)
Dept: OTOLARYNGOLOGY | Facility: HOSPITAL | Age: 85
End: 2025-07-01
Payer: MEDICARE

## 2025-08-12 ENCOUNTER — APPOINTMENT (OUTPATIENT)
Facility: CLINIC | Age: 85
End: 2025-08-12
Payer: MEDICARE

## 2025-08-12 VITALS — WEIGHT: 113 LBS | HEIGHT: 63 IN | BODY MASS INDEX: 20.02 KG/M2

## 2025-08-12 DIAGNOSIS — E03.9 ACQUIRED HYPOTHYROIDISM: ICD-10-CM

## 2025-08-12 DIAGNOSIS — C01 CANCER OF BASE OF TONGUE (MULTI): ICD-10-CM
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
## 2025-08-12 DIAGNOSIS — M27.2 OSTEORADIONECROSIS OF JAW: ICD-10-CM

## 2025-08-12 DIAGNOSIS — C06.9: Primary | ICD-10-CM

## 2025-08-12 PROCEDURE — 99213 OFFICE O/P EST LOW 20 MIN: CPT | Performed by: OTOLARYNGOLOGY

## 2025-08-12 PROCEDURE — 1159F MED LIST DOCD IN RCRD: CPT | Performed by: OTOLARYNGOLOGY

## 2025-08-12 PROCEDURE — 1160F RVW MEDS BY RX/DR IN RCRD: CPT | Performed by: OTOLARYNGOLOGY

## 2025-08-12 PROCEDURE — 31575 DIAGNOSTIC LARYNGOSCOPY: CPT | Performed by: OTOLARYNGOLOGY

## 2025-08-20 ENCOUNTER — TELEPHONE (OUTPATIENT)
Dept: OTOLARYNGOLOGY | Facility: CLINIC | Age: 85
End: 2025-08-20
Payer: MEDICARE
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
## 2025-08-20 DIAGNOSIS — M27.2 OSTEORADIONECROSIS OF JAW: ICD-10-CM

## 2025-08-20 RX ORDER — DOXYCYCLINE 100 MG/1
100 TABLET ORAL DAILY
Qty: 30 TABLET | Refills: 2 | Status: SHIPPED | OUTPATIENT
Start: 2025-08-20 | End: 2025-11-18

## 2025-10-28 ENCOUNTER — APPOINTMENT (OUTPATIENT)
Dept: DERMATOLOGY | Facility: CLINIC | Age: 85
End: 2025-10-28
Payer: MEDICARE